# Patient Record
Sex: FEMALE | Race: WHITE | Employment: FULL TIME | URBAN - METROPOLITAN AREA
[De-identification: names, ages, dates, MRNs, and addresses within clinical notes are randomized per-mention and may not be internally consistent; named-entity substitution may affect disease eponyms.]

---

## 2018-08-22 ENCOUNTER — HOSPITAL ENCOUNTER (OUTPATIENT)
Dept: MAMMOGRAPHY | Age: 58
Discharge: HOME | End: 2018-08-22
Attending: SURGERY
Payer: COMMERCIAL

## 2018-08-22 DIAGNOSIS — Z12.31 VISIT FOR SCREENING MAMMOGRAM: ICD-10-CM

## 2018-08-22 PROCEDURE — 77063 BREAST TOMOSYNTHESIS BI: CPT

## 2019-08-23 ENCOUNTER — HOSPITAL ENCOUNTER (OUTPATIENT)
Dept: MAMMOGRAPHY | Age: 59
Discharge: HOME | End: 2019-08-23
Attending: SURGERY
Payer: COMMERCIAL

## 2019-08-23 DIAGNOSIS — Z12.31 VISIT FOR SCREENING MAMMOGRAM: ICD-10-CM

## 2019-08-23 PROCEDURE — 77067 SCR MAMMO BI INCL CAD: CPT

## 2020-09-04 ENCOUNTER — HOSPITAL ENCOUNTER (OUTPATIENT)
Dept: MAMMOGRAPHY | Age: 60
Discharge: HOME | End: 2020-09-04
Attending: SURGERY
Payer: COMMERCIAL

## 2020-09-04 DIAGNOSIS — Z12.31 VISIT FOR SCREENING MAMMOGRAM: ICD-10-CM

## 2020-09-04 PROCEDURE — 77067 SCR MAMMO BI INCL CAD: CPT

## 2021-08-31 ENCOUNTER — APPOINTMENT (OUTPATIENT)
Dept: GENERAL RADIOLOGY | Age: 61
DRG: 660 | End: 2021-08-31
Attending: UROLOGY
Payer: COMMERCIAL

## 2021-08-31 ENCOUNTER — APPOINTMENT (OUTPATIENT)
Dept: CT IMAGING | Age: 61
DRG: 660 | End: 2021-08-31
Attending: EMERGENCY MEDICINE
Payer: COMMERCIAL

## 2021-08-31 ENCOUNTER — ANESTHESIA (OUTPATIENT)
Dept: SURGERY | Age: 61
DRG: 660 | End: 2021-08-31
Payer: COMMERCIAL

## 2021-08-31 ENCOUNTER — HOSPITAL ENCOUNTER (INPATIENT)
Age: 61
LOS: 3 days | Discharge: HOME | DRG: 660 | End: 2021-09-03
Attending: EMERGENCY MEDICINE | Admitting: INTERNAL MEDICINE
Payer: COMMERCIAL

## 2021-08-31 ENCOUNTER — ANESTHESIA EVENT (OUTPATIENT)
Dept: SURGERY | Age: 61
DRG: 660 | End: 2021-08-31
Payer: COMMERCIAL

## 2021-08-31 DIAGNOSIS — N20.0 KIDNEY STONE: Primary | ICD-10-CM

## 2021-08-31 DIAGNOSIS — N13.2 HYDRONEPHROSIS WITH URINARY OBSTRUCTION DUE TO URETERAL CALCULUS: ICD-10-CM

## 2021-08-31 DIAGNOSIS — N30.00 ACUTE CYSTITIS WITHOUT HEMATURIA: ICD-10-CM

## 2021-08-31 LAB
ALBUMIN SERPL-MCNC: 2.5 G/DL (ref 3.5–5)
ALBUMIN/GLOB SERPL: 0.5 {RATIO} (ref 1.1–2.2)
ALP SERPL-CCNC: 158 U/L (ref 45–117)
ALT SERPL-CCNC: 43 U/L (ref 12–78)
ANION GAP SERPL CALC-SCNC: 13 MMOL/L (ref 5–15)
APPEARANCE UR: ABNORMAL
AST SERPL-CCNC: 24 U/L (ref 15–37)
BACTERIA URNS QL MICRO: ABNORMAL /HPF
BASOPHILS # BLD: 0 K/UL (ref 0–0.1)
BASOPHILS NFR BLD: 0 % (ref 0–1)
BILIRUB SERPL-MCNC: 0.5 MG/DL (ref 0.2–1)
BILIRUB UR QL: NEGATIVE
BUN SERPL-MCNC: 20 MG/DL (ref 6–20)
BUN/CREAT SERPL: 22 (ref 12–20)
CALCIUM SERPL-MCNC: 9.3 MG/DL (ref 8.5–10.1)
CHLORIDE SERPL-SCNC: 98 MMOL/L (ref 97–108)
CO2 SERPL-SCNC: 24 MMOL/L (ref 21–32)
COLOR UR: ABNORMAL
COMMENT, HOLDF: NORMAL
CREAT SERPL-MCNC: 0.9 MG/DL (ref 0.55–1.02)
DIFFERENTIAL METHOD BLD: ABNORMAL
EOSINOPHIL # BLD: 0 K/UL (ref 0–0.4)
EOSINOPHIL NFR BLD: 0 % (ref 0–7)
EPITH CASTS URNS QL MICRO: ABNORMAL /LPF
ERYTHROCYTE [DISTWIDTH] IN BLOOD BY AUTOMATED COUNT: 13.2 % (ref 11.5–14.5)
GLOBULIN SER CALC-MCNC: 5 G/DL (ref 2–4)
GLUCOSE SERPL-MCNC: 154 MG/DL (ref 65–100)
GLUCOSE UR STRIP.AUTO-MCNC: NEGATIVE MG/DL
HCT VFR BLD AUTO: 34.2 % (ref 35–47)
HGB BLD-MCNC: 11.3 G/DL (ref 11.5–16)
HGB UR QL STRIP: ABNORMAL
IMM GRANULOCYTES # BLD AUTO: 0.1 K/UL (ref 0–0.04)
IMM GRANULOCYTES NFR BLD AUTO: 1 % (ref 0–0.5)
KETONES UR QL STRIP.AUTO: >80 MG/DL
LACTATE BLD-SCNC: 1.06 MMOL/L (ref 0.4–2)
LEUKOCYTE ESTERASE UR QL STRIP.AUTO: ABNORMAL
LIPASE SERPL-CCNC: 33 U/L (ref 73–393)
LYMPHOCYTES # BLD: 0.7 K/UL (ref 0.8–3.5)
LYMPHOCYTES NFR BLD: 5 % (ref 12–49)
MCH RBC QN AUTO: 28.7 PG (ref 26–34)
MCHC RBC AUTO-ENTMCNC: 33 G/DL (ref 30–36.5)
MCV RBC AUTO: 86.8 FL (ref 80–99)
MONOCYTES # BLD: 0.6 K/UL (ref 0–1)
MONOCYTES NFR BLD: 4 % (ref 5–13)
MUCOUS THREADS URNS QL MICRO: ABNORMAL /LPF
NEUTS SEG # BLD: 12.7 K/UL (ref 1.8–8)
NEUTS SEG NFR BLD: 90 % (ref 32–75)
NITRITE UR QL STRIP.AUTO: POSITIVE
NRBC # BLD: 0 K/UL (ref 0–0.01)
NRBC BLD-RTO: 0 PER 100 WBC
PH UR STRIP: 6 [PH] (ref 5–8)
PLATELET # BLD AUTO: 310 K/UL (ref 150–400)
PMV BLD AUTO: 10.5 FL (ref 8.9–12.9)
POTASSIUM SERPL-SCNC: 2.8 MMOL/L (ref 3.5–5.1)
PROT SERPL-MCNC: 7.5 G/DL (ref 6.4–8.2)
PROT UR STRIP-MCNC: 100 MG/DL
RBC # BLD AUTO: 3.94 M/UL (ref 3.8–5.2)
RBC #/AREA URNS HPF: ABNORMAL /HPF (ref 0–5)
RBC MORPH BLD: ABNORMAL
SAMPLES BEING HELD,HOLD: NORMAL
SODIUM SERPL-SCNC: 135 MMOL/L (ref 136–145)
SP GR UR REFRACTOMETRY: 1.02 (ref 1–1.03)
UR CULT HOLD, URHOLD: NORMAL
UROBILINOGEN UR QL STRIP.AUTO: 1 EU/DL (ref 0.2–1)
WBC # BLD AUTO: 14.1 K/UL (ref 3.6–11)
WBC URNS QL MICRO: >100 /HPF (ref 0–4)

## 2021-08-31 PROCEDURE — 96365 THER/PROPH/DIAG IV INF INIT: CPT

## 2021-08-31 PROCEDURE — 36415 COLL VENOUS BLD VENIPUNCTURE: CPT

## 2021-08-31 PROCEDURE — 83690 ASSAY OF LIPASE: CPT

## 2021-08-31 PROCEDURE — 80053 COMPREHEN METABOLIC PANEL: CPT

## 2021-08-31 PROCEDURE — 87186 SC STD MICRODIL/AGAR DIL: CPT

## 2021-08-31 PROCEDURE — 76010000138 HC OR TIME 0.5 TO 1 HR: Performed by: UROLOGY

## 2021-08-31 PROCEDURE — 74011250637 HC RX REV CODE- 250/637: Performed by: INTERNAL MEDICINE

## 2021-08-31 PROCEDURE — 85025 COMPLETE CBC W/AUTO DIFF WBC: CPT

## 2021-08-31 PROCEDURE — 74011250636 HC RX REV CODE- 250/636: Performed by: NURSE ANESTHETIST, CERTIFIED REGISTERED

## 2021-08-31 PROCEDURE — 74011250636 HC RX REV CODE- 250/636: Performed by: EMERGENCY MEDICINE

## 2021-08-31 PROCEDURE — 74011000258 HC RX REV CODE- 258: Performed by: EMERGENCY MEDICINE

## 2021-08-31 PROCEDURE — 77030018832 HC SOL IRR H20 ICUM -A: Performed by: UROLOGY

## 2021-08-31 PROCEDURE — 74011000258 HC RX REV CODE- 258

## 2021-08-31 PROCEDURE — BT1B1ZZ FLUOROSCOPY OF BLADDER AND URETHRA USING LOW OSMOLAR CONTRAST: ICD-10-PCS | Performed by: UROLOGY

## 2021-08-31 PROCEDURE — C2617 STENT, NON-COR, TEM W/O DEL: HCPCS | Performed by: UROLOGY

## 2021-08-31 PROCEDURE — 76060000032 HC ANESTHESIA 0.5 TO 1 HR: Performed by: UROLOGY

## 2021-08-31 PROCEDURE — 74011000636 HC RX REV CODE- 636: Performed by: UROLOGY

## 2021-08-31 PROCEDURE — 83605 ASSAY OF LACTIC ACID: CPT

## 2021-08-31 PROCEDURE — 77030010509 HC AIRWY LMA MSK TELE -A: Performed by: ANESTHESIOLOGY

## 2021-08-31 PROCEDURE — 87040 BLOOD CULTURE FOR BACTERIA: CPT

## 2021-08-31 PROCEDURE — 0T768DZ DILATION OF RIGHT URETER WITH INTRALUMINAL DEVICE, VIA NATURAL OR ARTIFICIAL OPENING ENDOSCOPIC: ICD-10-PCS | Performed by: UROLOGY

## 2021-08-31 PROCEDURE — 99285 EMERGENCY DEPT VISIT HI MDM: CPT

## 2021-08-31 PROCEDURE — 70450 CT HEAD/BRAIN W/O DYE: CPT

## 2021-08-31 PROCEDURE — 76210000006 HC OR PH I REC 0.5 TO 1 HR: Performed by: UROLOGY

## 2021-08-31 PROCEDURE — 65270000029 HC RM PRIVATE

## 2021-08-31 PROCEDURE — 77030019927 HC TBNG IRR CYSTO BAXT -A: Performed by: UROLOGY

## 2021-08-31 PROCEDURE — 74011000250 HC RX REV CODE- 250

## 2021-08-31 PROCEDURE — 87077 CULTURE AEROBIC IDENTIFY: CPT

## 2021-08-31 PROCEDURE — 74011250636 HC RX REV CODE- 250/636: Performed by: INTERNAL MEDICINE

## 2021-08-31 PROCEDURE — 99284 EMERGENCY DEPT VISIT MOD MDM: CPT

## 2021-08-31 PROCEDURE — 2709999900 HC NON-CHARGEABLE SUPPLY: Performed by: UROLOGY

## 2021-08-31 PROCEDURE — 96375 TX/PRO/DX INJ NEW DRUG ADDON: CPT

## 2021-08-31 PROCEDURE — 74011250636 HC RX REV CODE- 250/636

## 2021-08-31 PROCEDURE — 74011250636 HC RX REV CODE- 250/636: Performed by: UROLOGY

## 2021-08-31 PROCEDURE — 87086 URINE CULTURE/COLONY COUNT: CPT

## 2021-08-31 PROCEDURE — 81001 URINALYSIS AUTO W/SCOPE: CPT

## 2021-08-31 PROCEDURE — C1758 CATHETER, URETERAL: HCPCS | Performed by: UROLOGY

## 2021-08-31 PROCEDURE — 74176 CT ABD & PELVIS W/O CONTRAST: CPT

## 2021-08-31 PROCEDURE — 74420 UROGRAPHY RTRGR +-KUB: CPT

## 2021-08-31 DEVICE — URETERAL STENT
Type: IMPLANTABLE DEVICE | Status: FUNCTIONAL
Brand: CONTOUR™

## 2021-08-31 RX ORDER — FENTANYL CITRATE 50 UG/ML
INJECTION, SOLUTION INTRAMUSCULAR; INTRAVENOUS AS NEEDED
Status: DISCONTINUED | OUTPATIENT
Start: 2021-08-31 | End: 2021-08-31 | Stop reason: HOSPADM

## 2021-08-31 RX ORDER — POTASSIUM CHLORIDE 7.45 MG/ML
10 INJECTION INTRAVENOUS
Status: DISCONTINUED | OUTPATIENT
Start: 2021-08-31 | End: 2021-08-31 | Stop reason: SDUPTHER

## 2021-08-31 RX ORDER — SODIUM CHLORIDE 0.9 % (FLUSH) 0.9 %
5-40 SYRINGE (ML) INJECTION AS NEEDED
Status: DISCONTINUED | OUTPATIENT
Start: 2021-08-31 | End: 2021-08-31 | Stop reason: HOSPADM

## 2021-08-31 RX ORDER — HYDROMORPHONE HYDROCHLORIDE 1 MG/ML
0.5 INJECTION, SOLUTION INTRAMUSCULAR; INTRAVENOUS; SUBCUTANEOUS ONCE
Status: COMPLETED | OUTPATIENT
Start: 2021-08-31 | End: 2021-08-31

## 2021-08-31 RX ORDER — SODIUM CHLORIDE, SODIUM LACTATE, POTASSIUM CHLORIDE, CALCIUM CHLORIDE 600; 310; 30; 20 MG/100ML; MG/100ML; MG/100ML; MG/100ML
INJECTION, SOLUTION INTRAVENOUS
Status: DISCONTINUED | OUTPATIENT
Start: 2021-08-31 | End: 2021-08-31 | Stop reason: HOSPADM

## 2021-08-31 RX ORDER — POTASSIUM CHLORIDE 7.45 MG/ML
10 INJECTION INTRAVENOUS ONCE
Status: COMPLETED | OUTPATIENT
Start: 2021-08-31 | End: 2021-08-31

## 2021-08-31 RX ORDER — ONDANSETRON 2 MG/ML
INJECTION INTRAMUSCULAR; INTRAVENOUS AS NEEDED
Status: DISCONTINUED | OUTPATIENT
Start: 2021-08-31 | End: 2021-08-31 | Stop reason: HOSPADM

## 2021-08-31 RX ORDER — ACETAMINOPHEN 325 MG/1
650 TABLET ORAL
Status: DISCONTINUED | OUTPATIENT
Start: 2021-08-31 | End: 2021-09-03 | Stop reason: HOSPADM

## 2021-08-31 RX ORDER — LEVOFLOXACIN 5 MG/ML
500 INJECTION, SOLUTION INTRAVENOUS EVERY 24 HOURS
Status: CANCELLED | OUTPATIENT
Start: 2021-09-01

## 2021-08-31 RX ORDER — SODIUM CHLORIDE 0.9 % (FLUSH) 0.9 %
5-40 SYRINGE (ML) INJECTION AS NEEDED
Status: CANCELLED | OUTPATIENT
Start: 2021-08-31

## 2021-08-31 RX ORDER — ONDANSETRON 4 MG/1
4 TABLET, ORALLY DISINTEGRATING ORAL
Status: DISCONTINUED | OUTPATIENT
Start: 2021-08-31 | End: 2021-09-03 | Stop reason: HOSPADM

## 2021-08-31 RX ORDER — MIDAZOLAM HYDROCHLORIDE 1 MG/ML
1 INJECTION, SOLUTION INTRAMUSCULAR; INTRAVENOUS AS NEEDED
Status: DISCONTINUED | OUTPATIENT
Start: 2021-08-31 | End: 2021-08-31 | Stop reason: HOSPADM

## 2021-08-31 RX ORDER — SODIUM CHLORIDE 0.9 % (FLUSH) 0.9 %
5-40 SYRINGE (ML) INJECTION AS NEEDED
Status: DISCONTINUED | OUTPATIENT
Start: 2021-08-31 | End: 2021-09-03 | Stop reason: HOSPADM

## 2021-08-31 RX ORDER — SODIUM CHLORIDE 0.9 % (FLUSH) 0.9 %
5-40 SYRINGE (ML) INJECTION EVERY 8 HOURS
Status: DISCONTINUED | OUTPATIENT
Start: 2021-08-31 | End: 2021-08-31 | Stop reason: HOSPADM

## 2021-08-31 RX ORDER — ACETAMINOPHEN 500 MG
1000 TABLET ORAL EVERY 6 HOURS
Status: CANCELLED | OUTPATIENT
Start: 2021-08-31

## 2021-08-31 RX ORDER — POLYETHYLENE GLYCOL 3350 17 G/17G
17 POWDER, FOR SOLUTION ORAL DAILY PRN
Status: DISCONTINUED | OUTPATIENT
Start: 2021-08-31 | End: 2021-09-03 | Stop reason: HOSPADM

## 2021-08-31 RX ORDER — HYDROMORPHONE HYDROCHLORIDE 1 MG/ML
INJECTION, SOLUTION INTRAMUSCULAR; INTRAVENOUS; SUBCUTANEOUS AS NEEDED
Status: DISCONTINUED | OUTPATIENT
Start: 2021-08-31 | End: 2021-08-31 | Stop reason: HOSPADM

## 2021-08-31 RX ORDER — DIPHENHYDRAMINE HYDROCHLORIDE 50 MG/ML
12.5 INJECTION, SOLUTION INTRAMUSCULAR; INTRAVENOUS AS NEEDED
Status: DISCONTINUED | OUTPATIENT
Start: 2021-08-31 | End: 2021-08-31 | Stop reason: HOSPADM

## 2021-08-31 RX ORDER — MORPHINE SULFATE 2 MG/ML
2 INJECTION, SOLUTION INTRAMUSCULAR; INTRAVENOUS
Status: DISCONTINUED | OUTPATIENT
Start: 2021-08-31 | End: 2021-08-31 | Stop reason: HOSPADM

## 2021-08-31 RX ORDER — PROMETHAZINE HYDROCHLORIDE 25 MG/1
12.5 TABLET ORAL
Status: CANCELLED | OUTPATIENT
Start: 2021-08-31

## 2021-08-31 RX ORDER — ONDANSETRON 2 MG/ML
4 INJECTION INTRAMUSCULAR; INTRAVENOUS AS NEEDED
Status: DISCONTINUED | OUTPATIENT
Start: 2021-08-31 | End: 2021-08-31 | Stop reason: HOSPADM

## 2021-08-31 RX ORDER — MIDAZOLAM HYDROCHLORIDE 1 MG/ML
0.5 INJECTION, SOLUTION INTRAMUSCULAR; INTRAVENOUS
Status: DISCONTINUED | OUTPATIENT
Start: 2021-08-31 | End: 2021-08-31 | Stop reason: HOSPADM

## 2021-08-31 RX ORDER — SODIUM CHLORIDE 0.9 % (FLUSH) 0.9 %
5-40 SYRINGE (ML) INJECTION EVERY 8 HOURS
Status: DISCONTINUED | OUTPATIENT
Start: 2021-08-31 | End: 2021-09-03 | Stop reason: HOSPADM

## 2021-08-31 RX ORDER — ONDANSETRON 2 MG/ML
4 INJECTION INTRAMUSCULAR; INTRAVENOUS
Status: DISCONTINUED | OUTPATIENT
Start: 2021-08-31 | End: 2021-09-03 | Stop reason: HOSPADM

## 2021-08-31 RX ORDER — DEXTROSE, SODIUM CHLORIDE, AND POTASSIUM CHLORIDE 5; .45; .15 G/100ML; G/100ML; G/100ML
100 INJECTION INTRAVENOUS CONTINUOUS
Status: DISCONTINUED | OUTPATIENT
Start: 2021-08-31 | End: 2021-09-03

## 2021-08-31 RX ORDER — LIDOCAINE HYDROCHLORIDE 20 MG/ML
INJECTION, SOLUTION EPIDURAL; INFILTRATION; INTRACAUDAL; PERINEURAL AS NEEDED
Status: DISCONTINUED | OUTPATIENT
Start: 2021-08-31 | End: 2021-08-31 | Stop reason: HOSPADM

## 2021-08-31 RX ORDER — LIDOCAINE HYDROCHLORIDE 10 MG/ML
0.1 INJECTION, SOLUTION EPIDURAL; INFILTRATION; INTRACAUDAL; PERINEURAL AS NEEDED
Status: DISCONTINUED | OUTPATIENT
Start: 2021-08-31 | End: 2021-08-31 | Stop reason: HOSPADM

## 2021-08-31 RX ORDER — SODIUM CHLORIDE, SODIUM LACTATE, POTASSIUM CHLORIDE, CALCIUM CHLORIDE 600; 310; 30; 20 MG/100ML; MG/100ML; MG/100ML; MG/100ML
125 INJECTION, SOLUTION INTRAVENOUS CONTINUOUS
Status: DISCONTINUED | OUTPATIENT
Start: 2021-08-31 | End: 2021-08-31 | Stop reason: HOSPADM

## 2021-08-31 RX ORDER — SODIUM CHLORIDE 0.9 % (FLUSH) 0.9 %
5-40 SYRINGE (ML) INJECTION EVERY 8 HOURS
Status: CANCELLED | OUTPATIENT
Start: 2021-08-31

## 2021-08-31 RX ORDER — OXYCODONE AND ACETAMINOPHEN 5; 325 MG/1; MG/1
1 TABLET ORAL AS NEEDED
Status: DISCONTINUED | OUTPATIENT
Start: 2021-08-31 | End: 2021-08-31 | Stop reason: HOSPADM

## 2021-08-31 RX ORDER — LANOLIN ALCOHOL/MO/W.PET/CERES
3 CREAM (GRAM) TOPICAL
Status: DISCONTINUED | OUTPATIENT
Start: 2021-08-31 | End: 2021-09-03 | Stop reason: HOSPADM

## 2021-08-31 RX ORDER — HYDROMORPHONE HYDROCHLORIDE 1 MG/ML
0.2 INJECTION, SOLUTION INTRAMUSCULAR; INTRAVENOUS; SUBCUTANEOUS
Status: DISCONTINUED | OUTPATIENT
Start: 2021-08-31 | End: 2021-08-31 | Stop reason: HOSPADM

## 2021-08-31 RX ORDER — ACETAMINOPHEN 650 MG/1
650 SUPPOSITORY RECTAL
Status: DISCONTINUED | OUTPATIENT
Start: 2021-08-31 | End: 2021-09-03 | Stop reason: HOSPADM

## 2021-08-31 RX ORDER — OXYCODONE HYDROCHLORIDE 5 MG/1
5 TABLET ORAL
Status: CANCELLED | OUTPATIENT
Start: 2021-08-31

## 2021-08-31 RX ORDER — ONDANSETRON 2 MG/ML
4 INJECTION INTRAMUSCULAR; INTRAVENOUS
Status: CANCELLED | OUTPATIENT
Start: 2021-08-31

## 2021-08-31 RX ORDER — SODIUM CHLORIDE 9 MG/ML
25 INJECTION, SOLUTION INTRAVENOUS CONTINUOUS
Status: DISCONTINUED | OUTPATIENT
Start: 2021-08-31 | End: 2021-08-31 | Stop reason: HOSPADM

## 2021-08-31 RX ORDER — ACETAMINOPHEN 325 MG/1
650 TABLET ORAL ONCE
Status: DISCONTINUED | OUTPATIENT
Start: 2021-08-31 | End: 2021-08-31 | Stop reason: HOSPADM

## 2021-08-31 RX ORDER — FENTANYL CITRATE 50 UG/ML
50 INJECTION, SOLUTION INTRAMUSCULAR; INTRAVENOUS AS NEEDED
Status: DISCONTINUED | OUTPATIENT
Start: 2021-08-31 | End: 2021-08-31 | Stop reason: HOSPADM

## 2021-08-31 RX ORDER — ONDANSETRON 2 MG/ML
8 INJECTION INTRAMUSCULAR; INTRAVENOUS
Status: COMPLETED | OUTPATIENT
Start: 2021-08-31 | End: 2021-08-31

## 2021-08-31 RX ORDER — KETOROLAC TROMETHAMINE 30 MG/ML
15 INJECTION, SOLUTION INTRAMUSCULAR; INTRAVENOUS
Status: COMPLETED | OUTPATIENT
Start: 2021-08-31 | End: 2021-08-31

## 2021-08-31 RX ORDER — ONDANSETRON 4 MG/1
4 TABLET, ORALLY DISINTEGRATING ORAL
Status: CANCELLED | OUTPATIENT
Start: 2021-08-31

## 2021-08-31 RX ORDER — MIDAZOLAM HYDROCHLORIDE 1 MG/ML
INJECTION, SOLUTION INTRAMUSCULAR; INTRAVENOUS AS NEEDED
Status: DISCONTINUED | OUTPATIENT
Start: 2021-08-31 | End: 2021-08-31 | Stop reason: HOSPADM

## 2021-08-31 RX ORDER — PROPOFOL 10 MG/ML
INJECTION, EMULSION INTRAVENOUS AS NEEDED
Status: DISCONTINUED | OUTPATIENT
Start: 2021-08-31 | End: 2021-08-31 | Stop reason: HOSPADM

## 2021-08-31 RX ORDER — FENTANYL CITRATE 50 UG/ML
25 INJECTION, SOLUTION INTRAMUSCULAR; INTRAVENOUS
Status: DISCONTINUED | OUTPATIENT
Start: 2021-08-31 | End: 2021-08-31 | Stop reason: HOSPADM

## 2021-08-31 RX ADMIN — CEFTRIAXONE SODIUM 1 G: 1 INJECTION, POWDER, FOR SOLUTION INTRAMUSCULAR; INTRAVENOUS at 06:40

## 2021-08-31 RX ADMIN — ONDANSETRON HYDROCHLORIDE 4 MG: 2 INJECTION, SOLUTION INTRAMUSCULAR; INTRAVENOUS at 22:46

## 2021-08-31 RX ADMIN — ONDANSETRON HYDROCHLORIDE 4 MG: 2 INJECTION, SOLUTION INTRAMUSCULAR; INTRAVENOUS at 11:06

## 2021-08-31 RX ADMIN — POTASSIUM CHLORIDE 10 MEQ: 7.46 INJECTION, SOLUTION INTRAVENOUS at 09:58

## 2021-08-31 RX ADMIN — ACETAMINOPHEN 650 MG: 325 TABLET ORAL at 10:11

## 2021-08-31 RX ADMIN — Medication 10 ML: at 17:05

## 2021-08-31 RX ADMIN — SODIUM CHLORIDE 1000 ML: 9 INJECTION, SOLUTION INTRAVENOUS at 06:26

## 2021-08-31 RX ADMIN — LIDOCAINE HYDROCHLORIDE 60 MG: 20 INJECTION, SOLUTION EPIDURAL; INFILTRATION; INTRACAUDAL; PERINEURAL at 10:40

## 2021-08-31 RX ADMIN — ONDANSETRON 8 MG: 2 INJECTION INTRAMUSCULAR; INTRAVENOUS at 05:10

## 2021-08-31 RX ADMIN — HYDROMORPHONE HYDROCHLORIDE 0.5 MG: 1 INJECTION, SOLUTION INTRAMUSCULAR; INTRAVENOUS; SUBCUTANEOUS at 06:40

## 2021-08-31 RX ADMIN — MIDAZOLAM 2 MG: 1 INJECTION INTRAMUSCULAR; INTRAVENOUS at 10:30

## 2021-08-31 RX ADMIN — SODIUM CHLORIDE 1000 ML: 9 INJECTION, SOLUTION INTRAVENOUS at 05:09

## 2021-08-31 RX ADMIN — FENTANYL CITRATE 100 MCG: 50 INJECTION, SOLUTION INTRAMUSCULAR; INTRAVENOUS at 10:40

## 2021-08-31 RX ADMIN — HYDROMORPHONE HYDROCHLORIDE 0.2 MG: 1 INJECTION, SOLUTION INTRAMUSCULAR; INTRAVENOUS; SUBCUTANEOUS at 10:43

## 2021-08-31 RX ADMIN — KETOROLAC TROMETHAMINE 15 MG: 30 INJECTION, SOLUTION INTRAMUSCULAR; INTRAVENOUS at 05:13

## 2021-08-31 RX ADMIN — PROPOFOL 150 MG: 10 INJECTION, EMULSION INTRAVENOUS at 10:40

## 2021-08-31 RX ADMIN — POTASSIUM CHLORIDE, DEXTROSE MONOHYDRATE AND SODIUM CHLORIDE 100 ML/HR: 150; 5; 450 INJECTION, SOLUTION INTRAVENOUS at 12:53

## 2021-08-31 RX ADMIN — Medication 3 MG: at 22:39

## 2021-08-31 RX ADMIN — DEXMEDETOMIDINE HYDROCHLORIDE 4 MCG: 100 INJECTION, SOLUTION, CONCENTRATE INTRAVENOUS at 10:43

## 2021-08-31 RX ADMIN — SODIUM CHLORIDE, SODIUM LACTATE, POTASSIUM CHLORIDE, AND CALCIUM CHLORIDE: 600; 310; 30; 20 INJECTION, SOLUTION INTRAVENOUS at 10:30

## 2021-08-31 NOTE — H&P
History & Physical    Primary Care Provider: Simran Amanda MD  Source of Information: Patient     History of Presenting Illness:   Cleone Collet is a 61 y.o. female w/hx of breast cancer and kidney stones presents to the hospital with nausea, vomiting, left flank pain. Patient was also having low grade fever, decreased urine output and generalized body aches. She has no burning or frequency but did notice some blood in her urine and did a over-the-counter urine test and talked to her PCP who prescribed bactrim which she took one dose prior to hospital presentation. Review of Systems:  Review of Systems   Constitutional: Per HPI   HENT: Negative for ear pain, facial swelling, sore throat and trouble swallowing.    Eyes: No visual disturbance. Negative for pain and discharge. Respiratory: Negative for chest tightness, shortness of breath and wheezing.    Cardiovascular: Negative for chest pain and palpitations. Gastrointestinal: Negative for abdominal pain, blood in stool, nausea and vomiting. Genitourinary: Per HPI   Musculoskeletal: Negative for arthralgias, joint swelling, myalgias and neck pain. Skin: Negative for color change and rash. Neurological: Negative for  dizziness, headache, weakness or numbness. Hematological: Negative for adenopathy. Does not bruise/bleed easily. Psychiatric/Behavioral: Negative for behavioral problems, confusion and sleep disturbance.   All other systems reviewed and are negative. Past Medical History:   Diagnosis Date    Breast cancer, stage 1 ([facility] [address] 2007    Right    Calculus of kidney     Cancer ([facility] [address]     Cigarette smoker       Past Surgical History:   Procedure Laterality Date    ENDOSCOPY, COLON, DIAGNOSTIC      HX BREAST LUMPECTOMY Right 2007    radiation    HX LITHOTRIPSY       Prior to Admission medications    Medication Sig Start Date End Date Taking?  Authorizing Provider   ondansetron [facility] ODT) 4 mg disintegrating tablet Take 4 mg by mouth every eight (8) hours as needed for Nausea. Yes Provider, Historical   ondansetron (ZOFRAN ODT) 4 mg disintegrating tablet Take 1 Tab by mouth every eight (8) hours as needed for Nausea. 5/1/16  Yes Sari Primrose, MD     Allergies   Allergen Reactions    Codeine Nausea and Vomiting    Pcn [Penicillins] Nausea and Vomiting      Family History   Problem Relation Age of Onset   [facility] Stroke Mother     Heart Attack Mother     Coronary Artery Disease Mother     Heart Attack Father     Thyroid Disease Father     Coronary Artery Disease Father     Heart Attack Sister     High Cholesterol Sister     Coronary Artery Disease Sister     Heart Attack Brother     Coronary Artery Disease Brother         SOCIAL HISTORY:  Patient resides:  Independently x   Assisted Living    SNF    With family care       Smoking history:   None    Former    Chronic x     Alcohol history:   None    Social x   Chronic      Ambulates:   Independently x   w/cane    w/walker    w/wc    CODE STATUS:  DNR    Full x   Other      Objective:     Physical Exam:     Visit Vitals  /71   Pulse 77   Temp 98.1 °F (36.7 °C)   Resp 24   LMP 06/07/2002   SpO2 96%      O2 Device: None (Room air)    General:  Alert, cooperative, no distress, appears stated age. Head:  Normocephalic, without obvious abnormality, atraumatic. Eyes:  Conjunctivae/corneas clear. PERRL, EOMs intact. Nose: Nares normal. Septum midline. Mucosa normal. No drainage or sinus tenderness. Throat: Lips, mucosa, and tongue normal. Teeth and gums normal.   Neck: Supple, symmetrical, trachea midline, no adenopathy, thyroid: no enlargement/tenderness/nodules, no carotid bruit and no JVD. Back:   Symmetric, no curvature. ROM normal. No CVA tenderness. Lungs:   Clear to auscultation bilaterally. Chest wall:  No tenderness or deformity. Heart:  Regular rate and rhythm, S1, S2 normal, no murmur, click, rub or gallop. Abdomen:   Soft, right mild tenderness on RLQ. Bowel sounds normal. No masses,  No organomegaly. Extremities: Extremities normal, atraumatic, no cyanosis or edema. Pulses: 2+ and symmetric all extremities. Skin: Skin color, texture, turgor normal. No rashes or lesions   Neurologic: CNII-XII intact. Data Review:     Recent Days:  Recent Labs     08/31/21  0351   WBC 14.1*   HGB 11.3*   HCT 34.2*        Recent Labs     08/31/21  0351   *   K 2.8*   CL 98   CO2 24   *   BUN 20   CREA 0.90   CA 9.3   ALB 2.5*   TBILI 0.5   ALT 43     No results for input(s): PH, PCO2, PO2, HCO3, FIO2 in the last 72 hours. 24 Hour Results:  Recent Results (from the past 24 hour(s))   SAMPLES BEING HELD    Collection Time: 08/31/21  3:47 AM   Result Value Ref Range    SAMPLES BEING HELD 1RED,1BLU     COMMENT        Add-on orders for these samples will be processed based on acceptable specimen integrity and analyte stability, which may vary by analyte. CBC WITH AUTOMATED DIFF    Collection Time: 08/31/21  3:51 AM   Result Value Ref Range    WBC 14.1 (H) 3.6 - 11.0 K/uL    RBC 3.94 3.80 - 5.20 M/uL    HGB 11.3 (L) 11.5 - 16.0 g/dL    HCT 34.2 (L) 35.0 - 47.0 %    MCV 86.8 80.0 - 99.0 FL    MCH 28.7 26.0 - 34.0 PG    MCHC 33.0 30.0 - 36.5 g/dL    RDW 13.2 11.5 - 14.5 %    PLATELET 693 540 - 036 K/uL    MPV 10.5 8.9 - 12.9 FL    NRBC 0.0 0  WBC    ABSOLUTE NRBC 0.00 0.00 - 0.01 K/uL    NEUTROPHILS 90 (H) 32 - 75 %    LYMPHOCYTES 5 (L) 12 - 49 %    MONOCYTES 4 (L) 5 - 13 %    EOSINOPHILS 0 0 - 7 %    BASOPHILS 0 0 - 1 %    IMMATURE GRANULOCYTES 1 (H) 0.0 - 0.5 %    ABS. NEUTROPHILS 12.7 (H) 1.8 - 8.0 K/UL    ABS. LYMPHOCYTES 0.7 (L) 0.8 - 3.5 K/UL    ABS. MONOCYTES 0.6 0.0 - 1.0 K/UL    ABS. EOSINOPHILS 0.0 0.0 - 0.4 K/UL    ABS. BASOPHILS 0.0 0.0 - 0.1 K/UL    ABS. IMM.  GRANS. 0.1 (H) 0.00 - 0.04 K/UL    DF SMEAR SCANNED      RBC COMMENTS NORMOCYTIC, NORMOCHROMIC     METABOLIC PANEL, COMPREHENSIVE    Collection Time: 08/31/21  3:51 AM   Result Value Ref Range    Sodium 135 (L) 136 - 145 mmol/L    Potassium 2.8 (L) 3.5 - 5.1 mmol/L    Chloride 98 97 - 108 mmol/L    CO2 24 21 - 32 mmol/L    Anion gap 13 5 - 15 mmol/L    Glucose 154 (H) 65 - 100 mg/dL    BUN 20 6 - 20 MG/DL    Creatinine 0.90 0.55 - 1.02 MG/DL    BUN/Creatinine ratio 22 (H) 12 - 20      GFR est AA >60 >60 ml/min/1.73m2    GFR est non-AA >60 >60 ml/min/1.73m2    Calcium 9.3 8.5 - 10.1 MG/DL    Bilirubin, total 0.5 0.2 - 1.0 MG/DL    ALT (SGPT) 43 12 - 78 U/L    AST (SGOT) 24 15 - 37 U/L    Alk. phosphatase 158 (H) 45 - 117 U/L    Protein, total 7.5 6.4 - 8.2 g/dL    Albumin 2.5 (L) 3.5 - 5.0 g/dL    Globulin 5.0 (H) 2.0 - 4.0 g/dL    A-G Ratio 0.5 (L) 1.1 - 2.2     LIPASE    Collection Time: 08/31/21  3:51 AM   Result Value Ref Range    Lipase 33 (L) 73 - 393 U/L   POC LACTIC ACID    Collection Time: 08/31/21  5:11 AM   Result Value Ref Range    Lactic Acid (POC) 1.06 0.40 - 2.00 mmol/L   URINALYSIS W/MICROSCOPIC    Collection Time: 08/31/21  6:26 AM   Result Value Ref Range    Color YELLOW/STRAW      Appearance HAZY (A) CLEAR      Specific gravity 1.025 1.003 - 1.030      pH (UA) 6.0 5.0 - 8.0      Protein 100 (A) NEG mg/dL    Glucose Negative NEG mg/dL    Ketone >80 (A) NEG mg/dL    Bilirubin Negative NEG      Blood LARGE (A) NEG      Urobilinogen 1.0 0.2 - 1.0 EU/dL    Nitrites Positive (A) NEG      Leukocyte Esterase LARGE (A) NEG      WBC >100 (H) 0 - 4 /hpf    RBC  0 - 5 /hpf    Epithelial cells FEW FEW /lpf    Bacteria 4+ (A) NEG /hpf    Mucus 1+ (A) NEG /lpf   URINE CULTURE HOLD SAMPLE    Collection Time: 08/31/21  6:26 AM    Specimen: Serum; Urine   Result Value Ref Range    Urine culture hold        Urine on hold in Microbiology dept for 2 days. If unpreserved urine is submitted, it cannot be used for addtional testing after 24 hours, recollection will be required.          Imaging:     Assessment:     # Acute pyelonephritis   # Right hydronephrosis w/ obstructing nephrolithiasis   # Hypokalemia        Plan:     # Acute pyelonephritis  # Right hydronephrosis w/ obstructing nephrolithiasis  - UA suggestive for UTI  - follow urine and blood culture   - continue on Ceftriaxone   - IVF, antiemetic   - Urology consulted-- plan for ureteral stent placement     # Hypokalemia: replace as need     DVT ppx: Heparin   Code: Full        Signed By: Jaleel Dai MD     August 31, 2021

## 2021-08-31 NOTE — PERIOP NOTES
TRANSFER - OUT REPORT:    Verbal report given to   (name) on Ben Pel  being transferred to [facility](unit) for routine post - op       Report consisted of patients Situation, Background, Assessment and   Recommendations(SBAR). Time Pre op antibiotic given:Rocephin  Anesthesia Stop time: 11;15  White Present on Transfer to floor:no  Order for White on Chart:no  Discharge Prescriptions with Chart:no    Information from the following report(s) SBAR, Kardex, OR Summary, Procedure Summary, Intake/Output, MAR, Recent Results, Med Rec Status and Cardiac Rhythm SR was reviewed with the receiving nurse. Opportunity for questions and clarification was provided. Is the patient on 02? no       L/Min        Other     Is the patient on a monitor? NO    Is the nurse transporting with the patient? NO    Surgical Waiting Area notified of patient's transfer from PACU? YES      The following personal items collected during your admission accompanied patient upon transfer:   Dental Appliance: Dental Appliances: None  Vision: Visual Aid: Glasses, At bedside  Hearing Aid:    Jewelry: Jewelry: None  Clothing: Clothing: Bathrobe, Undergarments  Other Valuables:  Other Valuables: Cell Phone, Eyeglasses (Cell Phone sent to [employer])  Valuables sent to safe:

## 2021-08-31 NOTE — ED PROVIDER NOTES
HPI     61 female with a history of breast cancer, kidney stones, presents the emergency department with persistent headache nausea vomiting low-grade fevers and now left flank pain. Patient states she started to get sick last Thursday, 5 to 6 days ago with a headache and vomiting. [facility] and was [facility]. She is felt a little bit better on Friday with ODT Zofran and Motrin. She started vomiting again Friday evening so she went [place] [facility] [address] [place] [facility] on Saturday received IV Zofran a \"cut migraine cocktail\" CT scan of her head and did feel better. She states she is continuing to have vomiting decreased urine output. She has all over body aches. This evening she noticed left flank pain around 4 PM she has not been able to keep anything down for days. There is no diarrhea. She has no burning or frequency but did notice some blood in her urine and did a over-the-counter urine test.  Her doctor called in Bactrim which she took 1 dose today. She reports her headache is a 7 out of 10 and her flank pain is a 7-8 out of 10. She states they tested her for Covid on Saturday and it was negative. She has been vaccinated. She does have a slight cough.   She states fever started yesterday    Past Medical History:   Diagnosis Date    Breast cancer, stage 1 ([address] 2007    Right    Calculus of kidney     Cancer ([address]     Cigarette smoker        Past Surgical History:   Procedure Laterality Date    ENDOSCOPY, COLON, DIAGNOSTIC      HX BREAST LUMPECTOMY Right 2007    radiation    HX LITHOTRIPSY           Family History:   Problem Relation Age of Onset   [employer] Stroke Mother     Heart Attack Mother     Coronary Artery Disease Mother     Heart Attack Father     Thyroid Disease Father     Coronary Artery Disease Father     Heart Attack Sister     High Cholesterol Sister     Coronary Artery Disease Sister     Heart Attack Brother     Coronary Artery Disease Brother        Social History Socioeconomic History    Marital status: SINGLE     Spouse name: Not on file    Number of children: Not on file    Years of education: Not on file    Highest education level: Not on file   Occupational History    Not on file   Tobacco Use    Smoking status: Current Every Day Smoker    Smokeless tobacco: Never Used   Substance and Sexual Activity    Alcohol use: Yes    Drug use: No    Sexual activity: Yes     Partners: Female   Other Topics Concern    Not on file   Social History Narrative    Not on file     Social Determinants of Health     Financial Resource Strain:     Difficulty of Paying Living Expenses:    Food Insecurity:     Worried About Running Out of Food in the Last Year:     [address] in the Last Year:    Transportation Needs:     Lack of Transportation (Medical):  Lack of Transportation (Non-Medical):    Physical Activity:     Days of Exercise per Week:     Minutes of Exercise per Session:    Stress:     Feeling of Stress :    Social Connections:     Frequency of Communication with Friends and Family:     Frequency of Social Gatherings with Friends and Family:     Attends Muslim Services:     Active Member of Clubs or Organizations:     Attends Club or Organization Meetings:     Marital Status:    Intimate Partner Violence:     Fear of Current or Ex-Partner:     Emotionally Abused:     Physically Abused:     Sexually Abused: ALLERGIES: Codeine and Pcn [penicillins]    Review of Systems   Constitutional: Positive for fever. HENT: Positive for congestion. Eyes: Negative for visual disturbance. Respiratory: Negative for shortness of breath. Cardiovascular: Negative for chest pain. Gastrointestinal: Positive for abdominal pain, nausea and vomiting. Endocrine: Negative for polyuria. Genitourinary: Positive for decreased urine volume, flank pain and hematuria. Negative for dysuria. Musculoskeletal: Negative for gait problem. Neurological: Positive for headaches. Psychiatric/Behavioral: Negative for dysphoric mood. Vitals:    08/31/21 0320   BP: (!) 151/77   Pulse: 72   Resp: 16   Temp: 98 °F (36.7 °C)   SpO2: 98%            Physical Exam  Constitutional:       General: She is not in acute distress. Appearance: She is well-developed. Comments: Appears uncomfortable, sick but not toxic appearing   HENT:      Head: Normocephalic and atraumatic. Mouth/Throat:      Pharynx: No oropharyngeal exudate. Eyes:      General: No scleral icterus. Right eye: No discharge. Left eye: No discharge. Pupils: Pupils are equal, round, and reactive to light. Neck:      Vascular: No JVD. Cardiovascular:      Rate and Rhythm: Normal rate and regular rhythm. Heart sounds: Normal heart sounds. No murmur heard. Pulmonary:      Effort: Pulmonary effort is normal. No respiratory distress. Breath sounds: Normal breath sounds. No stridor. No wheezing or rales. Chest:      Chest wall: No tenderness. Abdominal:      General: Bowel sounds are normal. There is no distension. Palpations: Abdomen is soft. There is no mass. Tenderness: There is no abdominal tenderness. There is no guarding or rebound. Musculoskeletal:         General: Normal range of motion. Cervical back: Normal range of motion and neck supple. Skin:     General: Skin is warm and dry. Capillary Refill: Capillary refill takes less than 2 seconds. Findings: No rash. Neurological:      Mental Status: She is oriented to person, place, and time. Psychiatric:         Behavior: Behavior normal.         Thought Content: Thought content normal.         Judgment: Judgment normal.          MDM       Procedures      WBC 14K.  UTI.  5mm mid ureteral stone with hydro. Spoke with Urology, Dr. Xiao Juarez. Would like patient admitted to medicine, keep NPO and they will likely stent this morning.      Perfect Serve Consult for Admission  7:17 AM    ED Room Number: ER05/05  Patient Name and age:  Jaya Oliveira 61 y.o.  female  Working Diagnosis:   1. Kidney stone    2. Hydronephrosis with urinary obstruction due to ureteral calculus    3. Acute cystitis without hematuria        COVID-19 Suspicion:  no  Sepsis present:  no  Reassessment needed: no  Code Status:  Full Code  Readmission: no  Isolation Requirements:  no  Recommended Level of Care:  telemetry  Department:[facility] Adult ED - 21   Other:  61year old female without significnat PMHx other then kdiney stones, presents with fever, flank pain, headache, nausea/vomiting, WBC 14K here, lactate normal.  CT shows 5mm mid ureter stone with hdyro,  Has UTI as well. I spoke with urology, Dr. Theresa Chaparro- he would like her admitted, kept NPO and they will likely stent today. She's received fluids, zofran, ceftriaxone, toradol and dilaudid to control pain.

## 2021-08-31 NOTE — BRIEF OP NOTE
Brief Postoperative Note    Patient: Ben Krueger  YOB: 1960  MRN: [id number]    Date of Procedure: 8/31/2021     Pre-Op Diagnosis: unknown    Post-Op Diagnosis: Same as preoperative diagnosis.       Procedure(s):  CYSTO RIGHT STENT/ LEFT RETROGRADES/ ER    Surgeon(s):  Florence David MD    Surgical Assistant: None    Anesthesia: General     Estimated Blood Loss (mL): Minimal    Complications: None    Specimens: * No specimens in log *     Implants: * No implants in log *    Drains: * No LDAs found *    Findings: Right ureteral obstructing stone    Electronically Signed by Aissatou Rivers MD on 8/31/2021 at 11:19 AM

## 2021-08-31 NOTE — ED NOTES
TRANSFER - OUT REPORT:    Verbal report given to Mau Ramos RN (name) on Chely Cowart  being transferred to OR (unit) for routine progression of care       Report consisted of patients Situation, Background, Assessment and   Recommendations(SBAR). Information from the following report(s) SBAR, ED Summary and MAR was reviewed with the receiving nurse. Lines:   Peripheral IV 08/31/21 Left Antecubital (Active)        Opportunity for questions and clarification was provided.       Patient transported with:   [facility]

## 2021-08-31 NOTE — ED TRIAGE NOTES
She arrives by EMS. She reports being seen by [facility] Saturday for headache, and vomiting. She then had back pain today. She took an at home test that showed bladder infection. She spoke to her provider and was ordered Bactrim. She has been unable to keep the Bactrim down. The headache continues.

## 2021-08-31 NOTE — OP NOTES
UROLOGY OPERATIVE NOTE    Patient: Dannielle Bryan MRN: [id number]  SSN: [id number]    YOB: 1960  Age: 61 y.o. Sex: female          Pre-operative Diagnosis: Right ureteral stone, left flank pain  Post-operative Diagnosis: Same, no obstruction on left  Procedure: Cystoscopy, Ureteral Stent                    Right 6x24, Left Limited retrograde pyelogram   Surgeon: Joseph Brown MD  Assistant: None  Anesthesia:  General    Procedure Details: The patient was seen in the pre-operative area. The risks, benefits, complications, alternative treatment options, and expected outcomes were again discussed with the patient. The possibilities of reaction to medication, pain, infection, bleeding, major cardiovascular event, death, damage to surrounding structures were specifically addressed. Informed consent was then obtained. The site of surgery properly noted/marked. Upon arrival to the operative suite, the patient, procedure, and side were confirmed via a pre-operative \"time-out\". All were in agreement. The patient was carefully placed  in a dorsal lithotomy position and anethesia was induced. Sterile prep and drape of genitalia and perineum was performed. The cystoscope was inserted through a normal urethra. Survey of the bladder surface revealed no abnormalities. A guide wire was advanced up the  Right symptomatic side and seen it coil in the upper collecting system using fluoroscopy. The ureteral stent was then advanced over the wire. Coils were left to form in the renal pelvis and bladder nicely. An open end catheter was used to intubate the left ureter to the renal pelvis. The collecting system was illuminated using retrograde instillation of a limited amount of dye. No obstruction seen. The bladder was emptied, the scope was removed. At he conclusion of the case, all needle counts, instrument counts, and sponge counts were correct.     Findings:Right ureteral obstruction, Left no obstruction  Estimated Blood Loss:  Minimal     Implants: * No implants in log *  Disposition: Admit for IV abx  Plan:  KUB to assess stone opacity to allow for decision of ESWL vs ureteroscopy            Amaury Prieto MD

## 2021-08-31 NOTE — ANESTHESIA PREPROCEDURE EVALUATION
Relevant Problems   No relevant active problems       Anesthetic History   No history of anesthetic complications            Review of Systems / Medical History  Patient summary reviewed, nursing notes reviewed and pertinent labs reviewed    Pulmonary          Smoker         Neuro/Psych   Within defined limits           Cardiovascular  Within defined limits                     GI/Hepatic/Renal  Within defined limits              Endo/Other        Cancer     Other Findings              Physical Exam    Airway  Mallampati: II  TM Distance: > 6 cm  Neck ROM: normal range of motion   Mouth opening: Normal     Cardiovascular  Regular rate and rhythm,  S1 and S2 normal,  no murmur, click, rub, or gallop             Dental  No notable dental hx       Pulmonary  Breath sounds clear to auscultation               Abdominal  GI exam deferred       Other Findings            Anesthetic Plan    ASA: 2  Anesthesia type: general          Induction: Intravenous  Anesthetic plan and risks discussed with: Patient

## 2021-08-31 NOTE — PERIOP NOTES
TRANSFER - IN REPORT:    Verbal report received from [facility] RN(name) on Jaya Oliveira  being received from ED(unit) for ordered procedure      Report consisted of patients Situation, Background, Assessment and   Recommendations(SBAR). Information from the following report(s) SBAR, ED Summary, [place] and Recent Results was reviewed with the receiving nurse. Opportunity for questions and clarification was provided. Assessment completed upon patients arrival to unit and care assumed.

## 2021-08-31 NOTE — CONSULTS
Requesting Provider: Dirk Hester MD - Reason for Consultation: Marquita Whittington"  Pre-existing [facility] Patient:   Yes                Patient: Neftali Wolff MRN: [id number]  SSN: [id number]    YOB: 1960  Age: 61 y.o. Sex: female     Location: [place] [address]       Code Status: Full Code   PCP: Linda Carbajal MD  - [phone]   Emergency Contact:  Primary Emergency Contact: Lauren Kirkland   Race/Advent/Language: Maribel Reardon / Faina Lopez / Alice May: Payor: Brent Peter / Plan: Roxanne Cancer / Product Type: HMO /    Prior Admission Data: 5/1/16 [facility] EMERGENCY DEPT     Hospitalized:  Hospital Day: 1 - Admitted 8/31/2021  3:09 AM     CONSULTANTS  IP CONSULT TO UROLOGY   ADMISSION DIAGNOSES    ICD-10-CM ICD-9-CM   1. Kidney stone  N20.0 592.0   2. Hydronephrosis with urinary obstruction due to ureteral calculus  N13.2 592.1     591   3. Acute cystitis without hematuria  N30.00 595.0         Assessment/Plan:       · Left flank pain  · Right 5 mm mid ureteral stone with hydronephrosis  · Pyelonephritis    - Concern for decompensation and sepsis with urinary infection in the setting of ureteral obstruction. I have recommended cysto with right stent placement and left retrogrades today. Risks and benefits reviewed with the patient and she wishes to proceed. She has been NPO. Will arrange with the OR.   - Continue broad spectrum abx, follow cultures then tailor.   - Will follow. Supervising Dr. Greg ROSE     CC: Vomiting   HPI: She is a 61 y.o. female with a past medical history of breast cancer and kidney stones who presents to the emergency department with persistent headache, nausea, vomiting, and body aches for a week. She reports onset of fever to 100.8 F and left flank pain in the last 1-2 days with associated decreased UOP and hematuria. She notes some mild right flank pain. No relief with motrin.  Pain currently controlled with PRN dilaudid. She called her PCP who prescribed Bactrim, she took one dose last night. Seen in consultation by Urology for findings of a right ureteral stone on CT. She is a known patient to [facility], followed by Dr. Rosita Awad for hx of Gross hematuria, recurrent UTIs, left lower pole stone causing ball-valving and left adrenal adenoma. Lithotripsy was done for the stone in March 2021. Last seen in May 2021, see last OV note below. In the ED she is afebrile. HR and BP stable. WBC 14.1, Hgb 11.3, Cr 0.90. UA positive for large blood, nitrites, large leuks, >100 WBCs,  RBCs, 4+ bacteria. UCx and BCx pending. She is being treated empirically with Rocephin. CT A/P findings significant for right hydroureteronephrosis to a 5 mm mid ureteral calculus at the level of L5. She also has several nonobstructing punctate left renal calculi, no hydro. Images personally reviewed.       ======last OV note====  She returns for follow-up. Gross hematuria, recurrent UTIs, lower pole stone causing ball-valving and left adrenal adenoma. Returns after adrenal labs which were normal and reviewed. Ultrasound reviewed and it shows some fullness in the right kidney that could be a peripelvic cyst.  No definite hydronephrosis noted. She is no longer having a ball-valving pain or hematuria. Lithotripsy was done for the stone March 2021. PLAN:  Hematuria, recurrent UTIs, lower pole stone ball-valving symptoms, left adrenal adenoma, right-sided kidney fullness. Likely peripelvic cyst on the right side. Agrees to followup in three to six months with renal ultrasound. Agrees to a CT IVP if things are worse. Agrees to hold off otherwise. Agrees to call for symptoms in the interim. Agrees to recheck urine on followup. Transcribed by MOP/el      PAST MEDICAL HISTORY:    Allergies: PENICILLIN (Severe)  CODEINE (Severe)  DENIES: Latex, Shellfish, X-Ray Dye, Iodine.      Medications:     Problems: 591 Hydronephrosis (ICD-591) (EMJ33-A80.30)  Adrenal mass (ICD-255.9) (BMI51-P76.8)  Renal stone (ICD-592.0) (HVF98-S15.0)  Flank pain (ICD-789.09) (WGP98-T00.9)  599.71 GROSS HEMATURIA (ICD-599.71) (TEQ86-N76.0)  UTI (ICD-599.0) (MHT56-A15.0)  URETERAL STONE (ICD-592.1) (WSQ43-V55.1)    Illnesses: Kidney Problems and Cancer. DENIES: Heart Disease, Pacemaker/Defibrillator, Lung Disease, Diabetes, High Blood Pressure, Bowel Problems, Stroke/Seizure, Bleeding Problems, HIV, or Hepatitis. Surgeries: DENIES pertinent  surgeries      Family History: DENIES: Kidney cancer, Kidney disease, Kidney stones, Breast cancer, Uterine cancer, Cervical cancer, Ovarian cancer. Social History: Full Time. Other. Smoking status: former smoker. Does not drink alcohol. System Review: Admits to: None. DENIES: Unexplained Weight Loss, Dry Eyes, Dry Mouth, Leg Swelling, Shortness of Breath, Constipation, Involuntary Urine Loss, Lower Extremity Weakness, Dry Skin, Difficulty Walking, Psychiatric Problems, Impaired Sex Drive, Easy Bleeding, Rash.          URINALYSIS from Voided specimen  Urine Dip: pH: 6.0, Bld: Mod NH, LE: +, Nit: Neg, Prot: Neg, Ket: Neg, Gluc: Neg  Urine Micro: WBC: 1-2, RBC: 0, Bacteria: 0  Comment: Rare RBC        cc: Suma Silvestre MD  Transcribed by Speech [employer] Text [employer]  [employer]  E&M Service, Urinalysis Microscopic        Electronically signed by Micaela Arango on 2021 at 11:32 AM    Electronically signed by Rachel Wray MD on 2021 at 12:09 PM  ________________________________________________________________________      Temp (24hrs), Av °F (36.7 °C), Min:98 °F (36.7 °C), Max:98 °F (36.7 °C)       Creatinine   Date/Time Value Ref Range Status   2021 03:51 AM 0.90 0.55 - 1.02 MG/DL Final   2016 05:10 PM 0.98 0.55 - 1.02 MG/DL Final   2012 08:19 AM 0.7 0.6 - 1.3 MG/DL Final   2012 07:40 AM 0.8 0.6 - 1.3 MG/DL Final   2011 10:20 AM 0.79 0.57 - 1.00 mg/dL Final Current Antimicrobial Therapy (168h ago, onward)    None        Key Anti-Platelet Anticoagulant Meds     The patient is on no antiplatelet meds or anticoagulants. Diet: DIET NPO -       Labs     Lab Results   Component Value Date/Time    WBC 14.1 (H) 08/31/2021 03:51 AM    HCT 34.2 (L) 08/31/2021 03:51 AM    PLATELET 448 44/43/5868 03:51 AM    Sodium 135 (L) 08/31/2021 03:51 AM    Potassium 2.8 (L) 08/31/2021 03:51 AM    Chloride 98 08/31/2021 03:51 AM    CO2 24 08/31/2021 03:51 AM    BUN 20 08/31/2021 03:51 AM    Creatinine 0.90 08/31/2021 03:51 AM    Glucose 154 (H) 08/31/2021 03:51 AM    Calcium 9.3 08/31/2021 03:51 AM     UA:   Lab Results   Component Value Date/Time    Color YELLOW/STRAW 08/31/2021 06:26 AM    Appearance HAZY (A) 08/31/2021 06:26 AM    Specific gravity 1.025 08/31/2021 06:26 AM    Specific gravity 1.028 05/01/2016 05:10 PM    pH (UA) 6.0 08/31/2021 06:26 AM    Protein 100 (A) 08/31/2021 06:26 AM    Glucose Negative 08/31/2021 06:26 AM    Ketone >80 (A) 08/31/2021 06:26 AM    Bilirubin Negative 08/31/2021 06:26 AM    Urobilinogen 1.0 08/31/2021 06:26 AM    Nitrites Positive (A) 08/31/2021 06:26 AM    Leukocyte Esterase LARGE (A) 08/31/2021 06:26 AM    Epithelial cells FEW 08/31/2021 06:26 AM    Bacteria 4+ (A) 08/31/2021 06:26 AM    WBC >100 (H) 08/31/2021 06:26 AM    RBC  08/31/2021 06:26 AM     Imaging     Results for orders placed during the hospital encounter of 08/31/21    CT ABD PELV WO CONT    Narrative  INDICATION: left flank pain    COMPARISON: May 1, 2016    TECHNIQUE:  Noncontrast thin axial images were obtained through the abdomen and pelvis. Coronal and sagittal reconstructions were generated. CT dose reduction was  achieved through use of a standardized protocol tailored for this examination  and automatic exposure control for dose modulation. FINDINGS:  LUNG BASES: No abnormality. LIVER: No mass or biliary dilatation. GALLBLADDER: Unremarkable.   SPLEEN: No enlargement or lesion. PANCREAS: No mass or ductal dilatation. ADRENALS: 2 cm left adrenal hypodense nodule unchanged consistent with adenoma. KIDNEYS: Right hydroureteronephrosis due to a 5 mm mid ureteral calculus at the  level of L5. Several nonobstructing punctate left renal calculi. GI TRACT:  No bowel obstruction. Difficult to assess bowel wall thickening given  lack of oral contrast material.  PERITONEUM: No free air or free fluid. APPENDIX: Unremarkable. RETROPERITONEUM: No aortic aneurysm. LYMPH NODES:  None enlarged. ADDITIONAL COMMENTS: N/A.    URINARY BLADDER: Unremarkable. REPRODUCTIVE ORGANS: Fibroid uterus. LYMPH NODES:  None enlarged. FREE FLUID:  None. BONES: No destructive bone lesion. ADDITIONAL COMMENTS: N/A. Impression  1. Right hydronephrosis due to a 5 mm mid ureteral calculus. 2. Punctate nonobstructing left renal calculi. 3. Fibroid uterus. US Results (most recent):  Results from [place] encounter on 01/03/12    US ENDOVAGINAL    Narrative  **Final Report**      ICD Codes / Adm. Diagnosis: 589382   / Flank Pain  Examination:  US TRANSVAGINAL NON OB  - [id number] - [place]  3 2012 10:49AM  Accession No:  [id number]  Reason:  LT SIDED PAIN      REPORT:  INDICATION:  Pelvic pain. EXAM: PELVIC AND TRANSVAGINAL  ULTRASONOGRAPHY. COMPARISON: None  . PROCEDURE: The pelvis was scanned via high resolution real-time linear array  sonography, using both the transabdominal and transvaginal approach, which  was required for delineation of the endometrium and ovaries  . The  transabdominal imaging was unsuccessful due to an empty urinary bladder. Transvaginal examination was suboptimal due to the patient's inability to  tolerate the examination. FINDINGS TRANSABDOMINAL:  6. the uterus and ovaries cannot be visualized due  to an empty urinary bladder. FINDINGS TRANSVAGINAL:  The uterus shows overall size of 6.7 x 3.0 x 6.0 cm.   Imaging in the coronal plane suggests multiple lobular uterine masses versus  a possible bicornuate uterus, but this determination is difficult. Imaging  in the sagittal plane was not tolerated by the patient. The central  endometrium has not been well characterized. There is no endometrial mass or  fluid collection . Further evaluation of the ovaries transvaginally reveals the ovaries cannot  be visualized due to large uterine size. IMPRESSION:  1. Transabdominal  pelvic ultrasound revealing nonvisualization of the  uterus and ovaries due to an empty urinary bladder. .  2. Transvaginal pelvic ultrasound revealing technical difficulty due to the  patient's inability tolerate transvaginal examination. Therefore, the uterus  and endometrium have not been well-characterized. There are probably  multiple uterine myomata, but uterine anomaly is not excluded. 3. Correlate with serum beta-hCG if appropriate to determine the need for  urgent further imaging. Signing/Reading Doctor: Paula Naylor ([id number])  Approved: Paula Naylor ([id number])  01/04/2012      Cultures     All Micro Results     Procedure Component Value Units Date/Time    URINE CULTURE HOLD SAMPLE [[id number]] Collected: 08/31/21 0626    Order Status: Completed Specimen: Urine from Serum Updated: 08/31/21 0649     Urine culture hold       Urine on hold in Microbiology dept for 2 days. If unpreserved urine is submitted, it cannot be used for addtional testing after 24 hours, recollection will be required.           CULTURE, BLOOD, PAIRED [[id number]] Collected: 08/31/21 0530    Order Status: Completed Specimen: Blood Updated: 08/31/21 0559    CULTURE, URINE [[id number]]     Order Status: Sent Specimen: Urine from Clean catch            Past History: (Complete 2+/3 areas)     Allergies   Allergen Reactions    Codeine Nausea and Vomiting    Pcn [Penicillins] Nausea and Vomiting      Current Facility-Administered Medications   Medication Dose Route Frequency    potassium chloride 10 mEq in 100 ml IVPB  10 mEq IntraVENous NOW    sodium chloride (NS) flush 5-40 mL  5-40 mL IntraVENous Q8H    sodium chloride (NS) flush 5-40 mL  5-40 mL IntraVENous PRN    acetaminophen (TYLENOL) tablet 650 mg  650 mg Oral Q6H PRN    Or    acetaminophen (TYLENOL) suppository 650 mg  650 mg Rectal Q6H PRN    polyethylene glycol (MIRALAX) packet 17 g  17 g Oral DAILY PRN    ondansetron (ZOFRAN ODT) tablet 4 mg  4 mg Oral Q8H PRN    Or    ondansetron (ZOFRAN) injection 4 mg  4 mg IntraVENous Q6H PRN     Current Outpatient Medications   Medication Sig    ondansetron (ZOFRAN ODT) 4 mg disintegrating tablet Take 4 mg by mouth every eight (8) hours as needed for Nausea.  ondansetron (ZOFRAN ODT) 4 mg disintegrating tablet Take 1 Tab by mouth every eight (8) hours as needed for Nausea. Prior to Admission medications    Medication Sig Start Date End Date Taking? Authorizing Provider   ondansetron (ZOFRAN ODT) 4 mg disintegrating tablet Take 4 mg by mouth every eight (8) hours as needed for Nausea. Provider, Historical   ondansetron (ZOFRAN ODT) 4 mg disintegrating tablet Take 1 Tab by mouth every eight (8) hours as needed for Nausea. 5/1/16   Per Keating MD        PMHx:  has a past medical history of Breast cancer, stage 1 ([facility] [address] (2007), Calculus of kidney, Cancer ([facility] [address] and Cigarette smoker. PSurgHx:  has a past surgical history that includes endoscopy, colon, diagnostic; hx breast lumpectomy (Right, 2007); and hx lithotripsy. PSocHx:  reports that she has been smoking. She has never used smokeless tobacco. She reports current alcohol use. She reports that she does not use drugs.    ROS:  (Complete - 10 systems) - DENIES: Weightloss (Constitutional), Dry mouth (ENMT), Chest pain (CV), SOB (Respiratory), Constipation (GI), Weakness (MS), Pallor (Skin), TIA Sx (Neuro), Confusion (Psych), Easy bruising (Heme)    Physical Exam: (Comprehesive - 8+ 1995 Systems)     (1) Constitutional:  NAD; pleasant  FIO2:   on SpO2: O2 Sat (%): 98 %  O2 Device: None (Room air)    Patient Vitals for the past 24 hrs:   BP Temp Pulse Resp SpO2   08/31/21 0320 (!) 151/77 98 °F (36.7 °C) 72 16 98 %          (2) ENMT:   moist mucous membranes, normal sinuses   (3) Respiratory:  breathing easily, no distress   (4) GI:  no abdominal masses, LUQ tenderness   (5) :   Voiding independently, dark yellow UA, left CVA tenderness, mild right CVA tenderness   (6) Lymphatic:  no adenopathy, neck supple   (7) Muscloskeletal:  no gross deformity, normal ROM   (8) Skin:  no rash, warm & dry   (9) Neuro:  Alert, no focal deficits, normal speech      Signed By: Alla Sandoval, NP  - August 31, 2021

## 2021-08-31 NOTE — ANESTHESIA POSTPROCEDURE EVALUATION
Post-Anesthesia Evaluation and Assessment    Patient: Andres Grier MRN: [id number]  SSN: [id number]    YOB: 1960  Age: 61 y.o. Sex: female      I have evaluated the patient and they are stable and ready for discharge from the PACU. Cardiovascular Function/Vital Signs  Visit Vitals  /76   Pulse 66   Temp 36.7 °C (98.1 °F)   Resp 17   SpO2 97%       Patient is status post General anesthesia for Procedure(s):  CYSTO RIGHT STENT/ LEFT RETROGRADES/ ER. Nausea/Vomiting: None    Postoperative hydration reviewed and adequate. Pain:  Pain Scale 1: Numeric (0 - 10) (08/31/21 1150)  Pain Intensity 1: 0 (08/31/21 1150)   Managed    Neurological Status:   Neuro (WDL): Within Defined Limits (08/31/21 1150)  Neuro  LUE Motor Response: Purposeful (08/31/21 1150)  LLE Motor Response: Purposeful (08/31/21 1150)  RUE Motor Response: Purposeful (08/31/21 1150)  RLE Motor Response: Purposeful (08/31/21 1150)   At baseline    Mental Status, Level of Consciousness: Alert and  oriented to person, place, and time    Pulmonary Status:   O2 Device: None (Room air) (08/31/21 1155)   Adequate oxygenation and airway patent    Complications related to anesthesia: None    Post-anesthesia assessment completed. No concerns    Signed By: Flavia Johns MD     August 31, 2021              Procedure(s):  CYSTO RIGHT STENT/ LEFT RETROGRADES/ ER. general    <BSHSIANPOST>    INITIAL Post-op Vital signs:   Vitals Value Taken Time   /73 08/31/21 1215   Temp 36.7 °C (98.1 °F) 08/31/21 1150   Pulse 68 08/31/21 1216   Resp 18 08/31/21 1216   SpO2 97 % 08/31/21 1216   Vitals shown include unvalidated device data.

## 2021-08-31 NOTE — PROGRESS NOTES
TRANSFER - IN REPORT:    Verbal report received from Hilary Camarena RN (name) on [facility]  being received from PACU (unit) for routine post - op      Report consisted of patients Situation, Background, Assessment and   Recommendations(SBAR). Information from the following report(s) SBAR was reviewed with the receiving nurse. Opportunity for questions and clarification was provided. Assessment completed upon patients arrival to unit and care assumed.

## 2021-08-31 NOTE — PERIOP NOTES
[facility]   SIZE 6F X 24CM  Ref [id number]  [id number]  lOT [id number]  EXP [id number]

## 2021-09-01 LAB
ANION GAP SERPL CALC-SCNC: 6 MMOL/L (ref 5–15)
BUN SERPL-MCNC: 15 MG/DL (ref 6–20)
BUN/CREAT SERPL: 19 (ref 12–20)
CALCIUM SERPL-MCNC: 8.1 MG/DL (ref 8.5–10.1)
CHLORIDE SERPL-SCNC: 104 MMOL/L (ref 97–108)
CO2 SERPL-SCNC: 26 MMOL/L (ref 21–32)
CREAT SERPL-MCNC: 0.78 MG/DL (ref 0.55–1.02)
ERYTHROCYTE [DISTWIDTH] IN BLOOD BY AUTOMATED COUNT: 13.6 % (ref 11.5–14.5)
GLUCOSE SERPL-MCNC: 134 MG/DL (ref 65–100)
HCT VFR BLD AUTO: 28.4 % (ref 35–47)
HGB BLD-MCNC: 9.5 G/DL (ref 11.5–16)
MCH RBC QN AUTO: 28.8 PG (ref 26–34)
MCHC RBC AUTO-ENTMCNC: 33.5 G/DL (ref 30–36.5)
MCV RBC AUTO: 86.1 FL (ref 80–99)
NRBC # BLD: 0 K/UL (ref 0–0.01)
NRBC BLD-RTO: 0 PER 100 WBC
PLATELET # BLD AUTO: 270 K/UL (ref 150–400)
PMV BLD AUTO: 10.8 FL (ref 8.9–12.9)
POTASSIUM SERPL-SCNC: 3.1 MMOL/L (ref 3.5–5.1)
RBC # BLD AUTO: 3.3 M/UL (ref 3.8–5.2)
SODIUM SERPL-SCNC: 136 MMOL/L (ref 136–145)
WBC # BLD AUTO: 14.2 K/UL (ref 3.6–11)

## 2021-09-01 PROCEDURE — 85027 COMPLETE CBC AUTOMATED: CPT

## 2021-09-01 PROCEDURE — 74011250637 HC RX REV CODE- 250/637: Performed by: INTERNAL MEDICINE

## 2021-09-01 PROCEDURE — 74011000258 HC RX REV CODE- 258: Performed by: INTERNAL MEDICINE

## 2021-09-01 PROCEDURE — 36415 COLL VENOUS BLD VENIPUNCTURE: CPT

## 2021-09-01 PROCEDURE — 65270000029 HC RM PRIVATE

## 2021-09-01 PROCEDURE — 74011250636 HC RX REV CODE- 250/636: Performed by: INTERNAL MEDICINE

## 2021-09-01 PROCEDURE — 74011250636 HC RX REV CODE- 250/636: Performed by: UROLOGY

## 2021-09-01 PROCEDURE — 74011250637 HC RX REV CODE- 250/637: Performed by: FAMILY MEDICINE

## 2021-09-01 PROCEDURE — 80048 BASIC METABOLIC PNL TOTAL CA: CPT

## 2021-09-01 RX ORDER — POTASSIUM CHLORIDE 750 MG/1
20 TABLET, FILM COATED, EXTENDED RELEASE ORAL DAILY
Status: COMPLETED | OUTPATIENT
Start: 2021-09-01 | End: 2021-09-03

## 2021-09-01 RX ADMIN — POTASSIUM CHLORIDE, DEXTROSE MONOHYDRATE AND SODIUM CHLORIDE 100 ML/HR: 150; 5; 450 INJECTION, SOLUTION INTRAVENOUS at 21:10

## 2021-09-01 RX ADMIN — ONDANSETRON HYDROCHLORIDE 4 MG: 2 INJECTION, SOLUTION INTRAMUSCULAR; INTRAVENOUS at 03:26

## 2021-09-01 RX ADMIN — ACETAMINOPHEN 650 MG: 325 TABLET ORAL at 04:34

## 2021-09-01 RX ADMIN — ONDANSETRON HYDROCHLORIDE 4 MG: 2 INJECTION, SOLUTION INTRAMUSCULAR; INTRAVENOUS at 09:59

## 2021-09-01 RX ADMIN — Medication 10 ML: at 14:00

## 2021-09-01 RX ADMIN — Medication 10 ML: at 21:10

## 2021-09-01 RX ADMIN — CEFTRIAXONE SODIUM 1 G: 1 INJECTION, POWDER, FOR SOLUTION INTRAMUSCULAR; INTRAVENOUS at 21:11

## 2021-09-01 RX ADMIN — ONDANSETRON HYDROCHLORIDE 4 MG: 2 INJECTION, SOLUTION INTRAMUSCULAR; INTRAVENOUS at 21:16

## 2021-09-01 RX ADMIN — POTASSIUM CHLORIDE 20 MEQ: 750 TABLET, FILM COATED, EXTENDED RELEASE ORAL at 13:58

## 2021-09-01 RX ADMIN — ONDANSETRON HYDROCHLORIDE 4 MG: 2 INJECTION, SOLUTION INTRAMUSCULAR; INTRAVENOUS at 16:00

## 2021-09-01 RX ADMIN — Medication 3 MG: at 20:57

## 2021-09-01 NOTE — PROGRESS NOTES
TRANSITION OF CARE  RUR--8%  Disposition--Return home to independent functioning. Transport--Friend    Patient's primary family contact: friend Maggie Sic Management Interventions  PCP Verified by CM: Yes  Transition of Care Consult (CM Consult):  Other (None)  Physical Therapy Consult: No  Occupational Therapy Consult: No  Speech Therapy Consult: No  Current Support Network: Lives Alone  Confirm Follow Up Transport: Friends  The Plan for Transition of Care is Related to the Following Treatment Goals : Return to independent functioning  Discharge Location  Discharge Placement: Home    Reason for Admission:  Acute pyelonephritis            Right hydro nephrosis with obstructing nephrolithiasis            S/P 8/31/21 cysto right stent/left retrograde            Hypokalemia                   RUR Score:            8%           Plan for utilizing home health:  None        PCP: First and Last name:  Kinza Moise MD   Name of Practice:    Are you a current patient: Yes    Approximate date of last visit:    Can you participate in a virtual visit with your PCP:                     Current Advanced Directive/Advance Care Plan: Full Code      Healthcare Decision Maker:   Click here to complete [address] including selection of the Healthcare Decision Maker Relationship (ie \"Primary\")                             T

## 2021-09-01 NOTE — PROGRESS NOTES
Progress Note    Patient: Melchor Salinas MRN: [id number]  SSN: [id number]    YOB: 1960  Age: 61 y.o. Sex: female        ADMITTED:  2021 to Lory Alpers, MD  for Nephrolithiasis [N20.0]         Melchor Salinas is 1 Day Post-Op Procedure(s):  CYSTO RIGHT STENT/ LEFT RETROGRADES/ ER. She is doing well. Main complaint is difficulty sleeping overnight. Reports resolution of her headache and improvement in nausea. She is tolerating PO water intake. Notes improvement in left flank pain, attributes the pain to N/V for days. Denies right flank pain or stent colic. Admits urinary frequency. Denies dysuria or hematuria. Clear yellow UA in commode. AF, VSS. WBC unchanged, 14.2. Hgb 9.5. Cr improved, 0.78. BCx NGTD. UCx pending. +Rocephin. Vitals:  Temp (24hrs), Av.5 °F (36.9 °C), Min:98.1 °F (36.7 °C), Max:99 °F (37.2 °C)     Blood pressure (!) 144/72, pulse 63, temperature 98.3 °F (36.8 °C), resp. rate 16, last menstrual period 2002, SpO2 96 %. I&O's:   1901 -  0700  In: 1111.7 [P.O.:200; I.V.:911.7]  Out: -    No intake/output data recorded.      Exam:   Physical Exam  General: NAD, pleasant  Respiratory: no distress, breathing easily, room air  Abdomen: soft, no distention;mild LUQ tenderness to palpation  : no CVA tenderness, voiding independently, clear yellow UA  Neuro: Appropriate, no focal neurological deficits  Skin: warm, dry  Extremities: moves all, full ROM     Labs:   Recent Labs     21  0402 21  0351   WBC 14.2* 14.1*   HGB 9.5* 11.3*   HCT 28.4* 34.2*    310     Recent Labs     21  0402 21  0351    135*   K 3.1* 2.8*    98   CO2 26 24   * 154*   BUN 15 20   CREA 0.78 0.90   CA 8.1* 9.3        Cultures:      Imaging:       Assessment:     - 1 Day Post-Op Procedure(s):  CYSTO RIGHT STENT/ LEFT RETROGRADES/ ER    Active Problems:    Nephrolithiasis (8/31/2021)    left flank pain    Pyelonephritis     Right 5 mm mid ureteral stone s/p stent    Plan:     - Daily labs. - Follow UCx, then culture directed therapy. - Okay to eat. - Outpatient follow up with [facility] after discharge for definitive stone treatment, will arrange. She understands the importance of follow-up. Will sign off. Please call for further questions.    Supervising MD, Dr. Fam Hard  Signed By: Jeff Blevins NP - September 1, 2021

## 2021-09-01 NOTE — PROGRESS NOTES
[facility]  Hospitalist Group                                                                                          Hospitalist Progress Note  Anil Meza MD  Answering service: [phone] OR [phone] from in house phone              Progress Note    Patient: Barbara Schneider MRN: [id number]  SSN: [id number]    YOB: 1960  Age: 61 y.o. Sex: female      Admit Date: 8/31/2021    LOS: 1 day     Subjective:     Patient presents with nausea, vomiting and flank pain. Patient was found to have acute pyelonephritis and right hydronephrosis, patient was found to have right ureteral stone and patient underwent right ureteral stent placement. She is feeling better, not a lot of p.o. intake yet. Denies headache, dizziness, chest pain, shortness of breath, nausea, vomiting, abdominal pain. Objective:     Vitals:    08/31/21 1743 08/31/21 2000 09/01/21 0030 09/01/21 0730   BP: 128/68  116/70 (!) 144/72   Pulse: 76 74 72 63   Resp: 18 16 16 16   Temp: 99 °F (37.2 °C) 98.8 °F (37.1 °C) 98.8 °F (37.1 °C) 98.3 °F (36.8 °C)   SpO2: 95% 97% 97% 96%        Intake and Output:  Current Shift: No intake/output data recorded. Last three shifts: 08/30 1901 - 09/01 0700  In: 1111.7 [P.O.:200; I.V.:911.7]  Out: -     Physical Exam:   GENERAL: alert, cooperative, no distress, appears stated age  THROAT & NECK: normal and no erythema or exudates noted. LUNG: clear to auscultation bilaterally  HEART: regular rate and rhythm, S1, S2 normal, no murmur, click, rub or gallop  ABDOMEN: soft, non-tender. Bowel sounds normal. No masses,  no organomegaly  EXTREMITIES:  extremities normal, atraumatic, no cyanosis or edema  SKIN: no rash or abnormalities  NEUROLOGIC: AOx3  PSYCHIATRIC: non focal    Lab/Data Review: All lab results for the last 24 hours reviewed.      Recent Results (from the past 24 hour(s))   METABOLIC PANEL, BASIC    Collection Time: 09/01/21  4:02 AM   Result Value Ref Range Sodium 136 136 - 145 mmol/L    Potassium 3.1 (L) 3.5 - 5.1 mmol/L    Chloride 104 97 - 108 mmol/L    CO2 26 21 - 32 mmol/L    Anion gap 6 5 - 15 mmol/L    Glucose 134 (H) 65 - 100 mg/dL    BUN 15 6 - 20 MG/DL    Creatinine 0.78 0.55 - 1.02 MG/DL    BUN/Creatinine ratio 19 12 - 20      GFR est AA >60 >60 ml/min/1.73m2    GFR est non-AA >60 >60 ml/min/1.73m2    Calcium 8.1 (L) 8.5 - 10.1 MG/DL   CBC W/O DIFF    Collection Time: 09/01/21  4:02 AM   Result Value Ref Range    WBC 14.2 (H) 3.6 - 11.0 K/uL    RBC 3.30 (L) 3.80 - 5.20 M/uL    HGB 9.5 (L) 11.5 - 16.0 g/dL    HCT 28.4 (L) 35.0 - 47.0 %    MCV 86.1 80.0 - 99.0 FL    MCH 28.8 26.0 - 34.0 PG    MCHC 33.5 30.0 - 36.5 g/dL    RDW 13.6 11.5 - 14.5 %    PLATELET 561 873 - 569 K/uL    MPV 10.8 8.9 - 12.9 FL    NRBC 0.0 0  WBC    ABSOLUTE NRBC 0.00 0.00 - 0.01 K/uL        Imaging:    No results found. Assessment and Plan:     Acute pyelonephritis  -Likely due to obstructive uropathy from ureteral stone, s/p stent placement 8/31   -Urine culture grew gram-negative rods, blood cultures negative  -Continue Rocephin and follow final cultures    Right hydronephrosis  -Due to obstructive uropathy with 5 mm mid ureteral calculus  -Patient is s/p right ureteral stent placement 8/31  -Urology following, need outpatient follow-up for stone management    Hypokalemia  -Continue potassium replacement    Anemia  -Likely postoperative  -Monitor H&H    Elevated blood sugars  -No history of diabetes, check hemoglobin A1c in a.m. Discharge disposition: Likely in the next 24 to 48 hours.     Signed By: Bebo Romero MD     September 1, 2021

## 2021-09-02 LAB
ANION GAP SERPL CALC-SCNC: 6 MMOL/L (ref 5–15)
BACTERIA SPEC CULT: ABNORMAL
BASOPHILS # BLD: 0 K/UL (ref 0–0.1)
BASOPHILS NFR BLD: 0 % (ref 0–1)
BUN SERPL-MCNC: 10 MG/DL (ref 6–20)
BUN/CREAT SERPL: 15 (ref 12–20)
CALCIUM SERPL-MCNC: 8.3 MG/DL (ref 8.5–10.1)
CC UR VC: ABNORMAL
CHLORIDE SERPL-SCNC: 103 MMOL/L (ref 97–108)
CO2 SERPL-SCNC: 27 MMOL/L (ref 21–32)
CREAT SERPL-MCNC: 0.68 MG/DL (ref 0.55–1.02)
DIFFERENTIAL METHOD BLD: ABNORMAL
EOSINOPHIL # BLD: 0 K/UL (ref 0–0.4)
EOSINOPHIL NFR BLD: 0 % (ref 0–7)
ERYTHROCYTE [DISTWIDTH] IN BLOOD BY AUTOMATED COUNT: 13.9 % (ref 11.5–14.5)
EST. AVERAGE GLUCOSE BLD GHB EST-MCNC: 123 MG/DL
GLUCOSE SERPL-MCNC: 105 MG/DL (ref 65–100)
HBA1C MFR BLD: 5.9 % (ref 4–5.6)
HCT VFR BLD AUTO: 28.3 % (ref 35–47)
HGB BLD-MCNC: 9.7 G/DL (ref 11.5–16)
IMM GRANULOCYTES # BLD AUTO: 0.4 K/UL (ref 0–0.04)
IMM GRANULOCYTES NFR BLD AUTO: 3 % (ref 0–0.5)
LACTATE SERPL-SCNC: 0.9 MMOL/L (ref 0.4–2)
LYMPHOCYTES # BLD: 1.7 K/UL (ref 0.8–3.5)
LYMPHOCYTES NFR BLD: 13 % (ref 12–49)
MCH RBC QN AUTO: 29.2 PG (ref 26–34)
MCHC RBC AUTO-ENTMCNC: 34.3 G/DL (ref 30–36.5)
MCV RBC AUTO: 85.2 FL (ref 80–99)
MONOCYTES # BLD: 1.2 K/UL (ref 0–1)
MONOCYTES NFR BLD: 9 % (ref 5–13)
NEUTS SEG # BLD: 9.8 K/UL (ref 1.8–8)
NEUTS SEG NFR BLD: 75 % (ref 32–75)
NRBC # BLD: 0 K/UL (ref 0–0.01)
NRBC BLD-RTO: 0 PER 100 WBC
PLATELET # BLD AUTO: 329 K/UL (ref 150–400)
PMV BLD AUTO: 10.4 FL (ref 8.9–12.9)
POTASSIUM SERPL-SCNC: 3.2 MMOL/L (ref 3.5–5.1)
RBC # BLD AUTO: 3.32 M/UL (ref 3.8–5.2)
RBC MORPH BLD: ABNORMAL
SERVICE CMNT-IMP: ABNORMAL
SODIUM SERPL-SCNC: 136 MMOL/L (ref 136–145)
WBC # BLD AUTO: 13.1 K/UL (ref 3.6–11)

## 2021-09-02 PROCEDURE — 74011000258 HC RX REV CODE- 258: Performed by: INTERNAL MEDICINE

## 2021-09-02 PROCEDURE — 36415 COLL VENOUS BLD VENIPUNCTURE: CPT

## 2021-09-02 PROCEDURE — 65270000029 HC RM PRIVATE

## 2021-09-02 PROCEDURE — 74011250637 HC RX REV CODE- 250/637: Performed by: INTERNAL MEDICINE

## 2021-09-02 PROCEDURE — 80048 BASIC METABOLIC PNL TOTAL CA: CPT

## 2021-09-02 PROCEDURE — 74011250636 HC RX REV CODE- 250/636: Performed by: INTERNAL MEDICINE

## 2021-09-02 PROCEDURE — 85025 COMPLETE CBC W/AUTO DIFF WBC: CPT

## 2021-09-02 PROCEDURE — 87040 BLOOD CULTURE FOR BACTERIA: CPT

## 2021-09-02 PROCEDURE — 74011250636 HC RX REV CODE- 250/636: Performed by: UROLOGY

## 2021-09-02 PROCEDURE — 74011250637 HC RX REV CODE- 250/637: Performed by: FAMILY MEDICINE

## 2021-09-02 PROCEDURE — 83036 HEMOGLOBIN GLYCOSYLATED A1C: CPT

## 2021-09-02 PROCEDURE — 83605 ASSAY OF LACTIC ACID: CPT

## 2021-09-02 RX ADMIN — POTASSIUM CHLORIDE, DEXTROSE MONOHYDRATE AND SODIUM CHLORIDE 100 ML/HR: 150; 5; 450 INJECTION, SOLUTION INTRAVENOUS at 18:10

## 2021-09-02 RX ADMIN — Medication 3 MG: at 20:23

## 2021-09-02 RX ADMIN — ONDANSETRON HYDROCHLORIDE 4 MG: 2 INJECTION, SOLUTION INTRAMUSCULAR; INTRAVENOUS at 22:29

## 2021-09-02 RX ADMIN — Medication 10 ML: at 22:30

## 2021-09-02 RX ADMIN — POTASSIUM CHLORIDE 20 MEQ: 750 TABLET, FILM COATED, EXTENDED RELEASE ORAL at 09:39

## 2021-09-02 RX ADMIN — CEFTRIAXONE SODIUM 1 G: 1 INJECTION, POWDER, FOR SOLUTION INTRAMUSCULAR; INTRAVENOUS at 22:30

## 2021-09-02 RX ADMIN — CEFTRIAXONE SODIUM 1 G: 1 INJECTION, POWDER, FOR SOLUTION INTRAMUSCULAR; INTRAVENOUS at 20:23

## 2021-09-02 RX ADMIN — Medication 10 ML: at 14:00

## 2021-09-02 NOTE — PROGRESS NOTES
[address] [facility] Adult  Hospitalist Group                                                                                          Hospitalist Progress Note  Komal Veliz MD  Answering service: [phone] OR [phone] from in house phone              Progress Note    Patient: Kimberly Luke MRN: [id number]  SSN: [id number]    YOB: 1960  Age: 61 y.o. Sex: female      Admit Date: 8/31/2021    LOS: 2 days     Subjective:     Patient presents with nausea, vomiting and flank pain. Patient was found to have acute pyelonephritis and right hydronephrosis, patient was found to have right ureteral stone and patient underwent right ureteral stent placement. She is feeling better, not a lot of p.o. intake yet. Denies headache, dizziness, chest pain, shortness of breath, nausea, vomiting, abdominal pain. Objective:     Vitals:    09/01/21 1642 09/01/21 2112 09/02/21 0042 09/02/21 0814   BP: 127/74 (!) 148/73 (!) 147/78 (!) 143/76   Pulse: 65 67 66 66   Resp: 16 16 16 16   Temp: 98.6 °F (37 °C) 97.7 °F (36.5 °C) 98.1 °F (36.7 °C) 98.1 °F (36.7 °C)   SpO2: 97% 95% 97% 97%        Intake and Output:  Current Shift: No intake/output data recorded. Last three shifts: 08/31 1901 - 09/02 0700  In: -   Out: 3000 [Urine:3000]    Physical Exam:   GENERAL: alert, cooperative, no distress, appears stated age  THROAT & NECK: normal and no erythema or exudates noted. LUNG: clear to auscultation bilaterally  HEART: regular rate and rhythm, S1, S2 normal, no murmur, click, rub or gallop  ABDOMEN: soft, non-tender. Bowel sounds normal. No masses,  no organomegaly  EXTREMITIES:  extremities normal, atraumatic, no cyanosis or edema  SKIN: no rash or abnormalities  NEUROLOGIC: AOx3  PSYCHIATRIC: non focal    Lab/Data Review: All lab results for the last 24 hours reviewed.      Recent Results (from the past 24 hour(s))   METABOLIC PANEL, BASIC    Collection Time: 09/02/21  4:04 AM   Result Value Ref Range Sodium 136 136 - 145 mmol/L    Potassium 3.2 (L) 3.5 - 5.1 mmol/L    Chloride 103 97 - 108 mmol/L    CO2 27 21 - 32 mmol/L    Anion gap 6 5 - 15 mmol/L    Glucose 105 (H) 65 - 100 mg/dL    BUN 10 6 - 20 MG/DL    Creatinine 0.68 0.55 - 1.02 MG/DL    BUN/Creatinine ratio 15 12 - 20      GFR est AA >60 >60 ml/min/1.73m2    GFR est non-AA >60 >60 ml/min/1.73m2    Calcium 8.3 (L) 8.5 - 10.1 MG/DL   CBC WITH AUTOMATED DIFF    Collection Time: 09/02/21  4:04 AM   Result Value Ref Range    WBC 13.1 (H) 3.6 - 11.0 K/uL    RBC 3.32 (L) 3.80 - 5.20 M/uL    HGB 9.7 (L) 11.5 - 16.0 g/dL    HCT 28.3 (L) 35.0 - 47.0 %    MCV 85.2 80.0 - 99.0 FL    MCH 29.2 26.0 - 34.0 PG    MCHC 34.3 30.0 - 36.5 g/dL    RDW 13.9 11.5 - 14.5 %    PLATELET 564 056 - 015 K/uL    MPV 10.4 8.9 - 12.9 FL    NRBC 0.0 0  WBC    ABSOLUTE NRBC 0.00 0.00 - 0.01 K/uL    NEUTROPHILS 75 32 - 75 %    LYMPHOCYTES 13 12 - 49 %    MONOCYTES 9 5 - 13 %    EOSINOPHILS 0 0 - 7 %    BASOPHILS 0 0 - 1 %    IMMATURE GRANULOCYTES 3 (H) 0.0 - 0.5 %    ABS. NEUTROPHILS 9.8 (H) 1.8 - 8.0 K/UL    ABS. LYMPHOCYTES 1.7 0.8 - 3.5 K/UL    ABS. MONOCYTES 1.2 (H) 0.0 - 1.0 K/UL    ABS. EOSINOPHILS 0.0 0.0 - 0.4 K/UL    ABS. BASOPHILS 0.0 0.0 - 0.1 K/UL    ABS. IMM. GRANS. 0.4 (H) 0.00 - 0.04 K/UL    DF SMEAR SCANNED      RBC COMMENTS NORMOCYTIC, NORMOCHROMIC     CULTURE, BLOOD, PAIRED    Collection Time: 09/02/21  4:04 AM    Specimen: Blood   Result Value Ref Range    Special Requests: NO SPECIAL REQUESTS      Culture result: NO GROWTH AFTER 2 HOURS          Imaging:    No results found.        Assessment and Plan:     Acute pyelonephritis  -Likely due to obstructive uropathy from ureteral stone, s/p stent placement 8/31   -Urine culture grew gram-negative rods  -Continue Rocephin and follow final cultures    Bacteremia  -Initial blood cultures 8/31 showing gram-negative rods in 1 of 2 bottles  -Repeated blood cultures today  -ID consulted    Right hydronephrosis  -Due to obstructive uropathy with 5 mm mid ureteral calculus  -Patient is s/p right ureteral stent placement 8/31  -Urology following, need outpatient follow-up for stone management    Hypokalemia  -Continue potassium replacement    Anemia  -Likely postoperative  -Monitor H&H    Elevated blood sugars  -No history of diabetes, check hemoglobin A1c    Discharge disposition: Likely in the next 24 to 48 hours.     Signed By: Zelda Curran MD     September 2, 2021

## 2021-09-03 VITALS
SYSTOLIC BLOOD PRESSURE: 161 MMHG | OXYGEN SATURATION: 98 % | HEART RATE: 65 BPM | DIASTOLIC BLOOD PRESSURE: 86 MMHG | RESPIRATION RATE: 16 BRPM | TEMPERATURE: 98.4 F

## 2021-09-03 LAB
ANION GAP SERPL CALC-SCNC: 7 MMOL/L (ref 5–15)
BASOPHILS # BLD: 0.1 K/UL (ref 0–0.1)
BASOPHILS NFR BLD: 1 % (ref 0–1)
BUN SERPL-MCNC: 9 MG/DL (ref 6–20)
BUN/CREAT SERPL: 16 (ref 12–20)
CALCIUM SERPL-MCNC: 8.4 MG/DL (ref 8.5–10.1)
CHLORIDE SERPL-SCNC: 101 MMOL/L (ref 97–108)
CO2 SERPL-SCNC: 26 MMOL/L (ref 21–32)
CREAT SERPL-MCNC: 0.58 MG/DL (ref 0.55–1.02)
DIFFERENTIAL METHOD BLD: ABNORMAL
EOSINOPHIL # BLD: 0.1 K/UL (ref 0–0.4)
EOSINOPHIL NFR BLD: 1 % (ref 0–7)
ERYTHROCYTE [DISTWIDTH] IN BLOOD BY AUTOMATED COUNT: 13.8 % (ref 11.5–14.5)
GLUCOSE SERPL-MCNC: 129 MG/DL (ref 65–100)
HCT VFR BLD AUTO: 30 % (ref 35–47)
HGB BLD-MCNC: 10.3 G/DL (ref 11.5–16)
IMM GRANULOCYTES # BLD AUTO: 0 K/UL
IMM GRANULOCYTES NFR BLD AUTO: 0 %
LYMPHOCYTES # BLD: 1.9 K/UL (ref 0.8–3.5)
LYMPHOCYTES NFR BLD: 17 % (ref 12–49)
MCH RBC QN AUTO: 29.4 PG (ref 26–34)
MCHC RBC AUTO-ENTMCNC: 34.3 G/DL (ref 30–36.5)
MCV RBC AUTO: 85.7 FL (ref 80–99)
MONOCYTES # BLD: 1 K/UL (ref 0–1)
MONOCYTES NFR BLD: 9 % (ref 5–13)
NEUTS BAND NFR BLD MANUAL: 6 % (ref 0–6)
NEUTS SEG # BLD: 7.9 K/UL (ref 1.8–8)
NEUTS SEG NFR BLD: 66 % (ref 32–75)
NRBC # BLD: 0 K/UL (ref 0–0.01)
NRBC BLD-RTO: 0 PER 100 WBC
PLATELET # BLD AUTO: 371 K/UL (ref 150–400)
PLATELET COMMENTS,PCOM: ABNORMAL
PMV BLD AUTO: 9.9 FL (ref 8.9–12.9)
POTASSIUM SERPL-SCNC: 3.1 MMOL/L (ref 3.5–5.1)
RBC # BLD AUTO: 3.5 M/UL (ref 3.8–5.2)
RBC MORPH BLD: ABNORMAL
SODIUM SERPL-SCNC: 134 MMOL/L (ref 136–145)
WBC # BLD AUTO: 11 K/UL (ref 3.6–11)

## 2021-09-03 PROCEDURE — 85025 COMPLETE CBC W/AUTO DIFF WBC: CPT

## 2021-09-03 PROCEDURE — 74011250637 HC RX REV CODE- 250/637: Performed by: FAMILY MEDICINE

## 2021-09-03 PROCEDURE — 36415 COLL VENOUS BLD VENIPUNCTURE: CPT

## 2021-09-03 PROCEDURE — 74011250636 HC RX REV CODE- 250/636: Performed by: UROLOGY

## 2021-09-03 PROCEDURE — 80048 BASIC METABOLIC PNL TOTAL CA: CPT

## 2021-09-03 PROCEDURE — 74011250637 HC RX REV CODE- 250/637: Performed by: INTERNAL MEDICINE

## 2021-09-03 RX ORDER — POTASSIUM CHLORIDE 750 MG/1
20 TABLET, FILM COATED, EXTENDED RELEASE ORAL 2 TIMES DAILY
Status: DISCONTINUED | OUTPATIENT
Start: 2021-09-03 | End: 2021-09-03 | Stop reason: HOSPADM

## 2021-09-03 RX ORDER — LEVOFLOXACIN 750 MG/1
750 TABLET ORAL DAILY
Qty: 11 TABLET | Refills: 0 | Status: SHIPPED | OUTPATIENT
Start: 2021-09-03 | End: 2021-11-23 | Stop reason: ALTCHOICE

## 2021-09-03 RX ORDER — POTASSIUM CHLORIDE 1500 MG/1
20 TABLET, FILM COATED, EXTENDED RELEASE ORAL DAILY
Qty: 5 TABLET | Refills: 0 | Status: SHIPPED | OUTPATIENT
Start: 2021-09-03 | End: 2021-11-23 | Stop reason: ALTCHOICE

## 2021-09-03 RX ADMIN — POTASSIUM CHLORIDE 20 MEQ: 750 TABLET, FILM COATED, EXTENDED RELEASE ORAL at 09:38

## 2021-09-03 RX ADMIN — POTASSIUM CHLORIDE, DEXTROSE MONOHYDRATE AND SODIUM CHLORIDE 100 ML/HR: 150; 5; 450 INJECTION, SOLUTION INTRAVENOUS at 13:36

## 2021-09-03 RX ADMIN — POTASSIUM CHLORIDE, DEXTROSE MONOHYDRATE AND SODIUM CHLORIDE 100 ML/HR: 150; 5; 450 INJECTION, SOLUTION INTRAVENOUS at 03:43

## 2021-09-03 RX ADMIN — ACETAMINOPHEN 650 MG: 325 TABLET ORAL at 04:40

## 2021-09-03 RX ADMIN — Medication 10 ML: at 14:57

## 2021-09-03 NOTE — CONSULTS
ID Consult Note  NAME:  Jane Orona   :   1960   MRN:   [id number]   Date/Time:  2021 8:59 PM  Subjective:   REASON FOR CONSULT: Bacteremia  Guanakito Sinha is a 61 y.o. with a history of stage I breast cancer and nephrolithiasis. The patient recently developed acute onset nausea vomiting and right flank pain. Pain was severe. It was nonradiating. There were no alleviating factors before she came to the hospital.  She had an episode of fever, but no chills. Here in the emergency room, a CT scan was done which showed right hydronephrosis with obstructing ureterolithiasis. She was brought to the operating room for cystoscopy and right stent placement by urology. Her urine culture is growing E. coli while her blood cultures are growing gram-negative rods in a single bottle. She is currently on ceftriaxone 1 g daily and we are being asked to see her in consult. She does not have a port.       Past Medical History:   Diagnosis Date    Breast cancer, stage 1 ([address]     Right    Calculus of kidney     Cancer [facility] [address]     Cigarette smoker       Past Surgical History:   Procedure Laterality Date    ENDOSCOPY, COLON, DIAGNOSTIC      HX BREAST LUMPECTOMY Right 2007    radiation    HX LITHOTRIPSY       Social History     Tobacco Use    Smoking status: Current Every Day Smoker    Smokeless tobacco: Never Used   Substance Use Topics    Alcohol use: Yes      Family History   Problem Relation Age of Onset   [employer] Stroke Mother     Heart Attack Mother     Coronary Artery Disease Mother     Heart Attack Father     Thyroid Disease Father     Coronary Artery Disease Father     Heart Attack Sister     High Cholesterol Sister     Coronary Artery Disease Sister     Heart Attack Brother     Coronary Artery Disease Brother       Allergies   Allergen Reactions    Codeine Nausea and Vomiting    Pcn [Penicillins] Nausea and Vomiting      Home Medications:  Prior to Admission Medications   Prescriptions Last Dose Informant Patient Reported? Taking?   ondansetron (ZOFRAN ODT) 4 mg disintegrating tablet   Yes No   Sig: Take 4 mg by mouth every eight (8) hours as needed for Nausea. ondansetron (ZOFRAN ODT) 4 mg disintegrating tablet 8/30/2021 at Unknown time  No Yes   Sig: Take 1 Tab by mouth every eight (8) hours as needed for Nausea.       Facility-Administered Medications: None     Hospital medications:  Current Facility-Administered Medications   Medication Dose Route Frequency    potassium chloride SR (KLOR-CON 10) tablet 20 mEq  20 mEq Oral DAILY    sodium chloride (NS) flush 5-40 mL  5-40 mL IntraVENous Q8H    sodium chloride (NS) flush 5-40 mL  5-40 mL IntraVENous PRN    acetaminophen (TYLENOL) tablet 650 mg  650 mg Oral Q6H PRN    Or    acetaminophen (TYLENOL) suppository 650 mg  650 mg Rectal Q6H PRN    polyethylene glycol (MIRALAX) packet 17 g  17 g Oral DAILY PRN    ondansetron (ZOFRAN ODT) tablet 4 mg  4 mg Oral Q8H PRN    Or    ondansetron (ZOFRAN) injection 4 mg  4 mg IntraVENous Q6H PRN    dextrose 5% - 0.45% NaCl with KCl 20 mEq/L infusion  100 mL/hr IntraVENous CONTINUOUS    cefTRIAXone (ROCEPHIN) 1 g in 0.9% sodium chloride (MBP/ADV) 50 mL MBP  1 g IntraVENous Q24H    melatonin tablet 3 mg  3 mg Oral QHS PRN     REVIEW OF SYSTEMS:        Const:   negative weight loss  Eyes:   negative diplopia or visual changes, negative eye pain  ENT:   negative coryza, negative sore throat  Resp:   negative cough, hemoptysis, dyspnea  Cards:  negative for chest pain, palpitations  :  She did have some hematuria  GI:   Negative for hematemesis and hematochezia  Skin:   negative for rash and pruritus  Heme:   negative for easy bruising and gum/nose bleeding  MS:  negative for myalgias, arthralgias  Neurolo:  negative for headaches, dizziness   Psych:  negative for hallucinations        Objective:   VITALS:    Visit Vitals  BP (!) 160/71   Pulse (!) 59   Temp 98.4 °F (36.9 °C)   Resp 16   LMP 06/07/2002 SpO2 97%     Temp (24hrs), Av.3 °F (36.8 °C), Min:97.7 °F (36.5 °C), Max:99.2 °F (37.3 °C)    PHYSICAL EXAM:   General:    Alert, cooperative, no distress, appears stated age. Head:   Normocephalic, without obvious abnormality, atraumatic. Eyes:   Conjunctivae clear, anicteric sclerae. Nose:  Nares normal.   Throat:    Lips and tongue normal.  No Thrush  Neck:  Supple, symmetrical    no carotid bruit and no JVD. :    No CVA tenderness, no barrios catheter  Lungs:   Clear to auscultation bilaterally. No Wheezing or Rhonchi. No rales. Heart:   Regular rate and rhythm,  no murmur, rub or gallop. Abdomen:   Soft, non-tender,not distended. Extremities: Knees, ankles, wrists, elbows are not warm and not tender. Skin:     No rashes or lesions.   Not Jaundiced  Lymph: Cervical normal  Neurologic:  no facial asymmetry, tongue midline muscle strength 5 out of 5  Psychiatric: Mood and affect normal  LAB DATA REVIEWED:    Recent Results (from the past 48 hour(s))   METABOLIC PANEL, BASIC    Collection Time: 21  4:02 AM   Result Value Ref Range    Sodium 136 136 - 145 mmol/L    Potassium 3.1 (L) 3.5 - 5.1 mmol/L    Chloride 104 97 - 108 mmol/L    CO2 26 21 - 32 mmol/L    Anion gap 6 5 - 15 mmol/L    Glucose 134 (H) 65 - 100 mg/dL    BUN 15 6 - 20 MG/DL    Creatinine 0.78 0.55 - 1.02 MG/DL    BUN/Creatinine ratio 19 12 - 20      GFR est AA >60 >60 ml/min/1.73m2    GFR est non-AA >60 >60 ml/min/1.73m2    Calcium 8.1 (L) 8.5 - 10.1 MG/DL   CBC W/O DIFF    Collection Time: 21  4:02 AM   Result Value Ref Range    WBC 14.2 (H) 3.6 - 11.0 K/uL    RBC 3.30 (L) 3.80 - 5.20 M/uL    HGB 9.5 (L) 11.5 - 16.0 g/dL    HCT 28.4 (L) 35.0 - 47.0 %    MCV 86.1 80.0 - 99.0 FL    MCH 28.8 26.0 - 34.0 PG    MCHC 33.5 30.0 - 36.5 g/dL    RDW 13.6 11.5 - 14.5 %    PLATELET 064 223 - 430 K/uL    MPV 10.8 8.9 - 12.9 FL    NRBC 0.0 0  WBC    ABSOLUTE NRBC 0.00 0.00 - 8.21 K/uL   METABOLIC PANEL, BASIC    Collection Time: 09/02/21  4:04 AM   Result Value Ref Range    Sodium 136 136 - 145 mmol/L    Potassium 3.2 (L) 3.5 - 5.1 mmol/L    Chloride 103 97 - 108 mmol/L    CO2 27 21 - 32 mmol/L    Anion gap 6 5 - 15 mmol/L    Glucose 105 (H) 65 - 100 mg/dL    BUN 10 6 - 20 MG/DL    Creatinine 0.68 0.55 - 1.02 MG/DL    BUN/Creatinine ratio 15 12 - 20      GFR est AA >60 >60 ml/min/1.73m2    GFR est non-AA >60 >60 ml/min/1.73m2    Calcium 8.3 (L) 8.5 - 10.1 MG/DL   CBC WITH AUTOMATED DIFF    Collection Time: 09/02/21  4:04 AM   Result Value Ref Range    WBC 13.1 (H) 3.6 - 11.0 K/uL    RBC 3.32 (L) 3.80 - 5.20 M/uL    HGB 9.7 (L) 11.5 - 16.0 g/dL    HCT 28.3 (L) 35.0 - 47.0 %    MCV 85.2 80.0 - 99.0 FL    MCH 29.2 26.0 - 34.0 PG    MCHC 34.3 30.0 - 36.5 g/dL    RDW 13.9 11.5 - 14.5 %    PLATELET 203 581 - 723 K/uL    MPV 10.4 8.9 - 12.9 FL    NRBC 0.0 0  WBC    ABSOLUTE NRBC 0.00 0.00 - 0.01 K/uL    NEUTROPHILS 75 32 - 75 %    LYMPHOCYTES 13 12 - 49 %    MONOCYTES 9 5 - 13 %    EOSINOPHILS 0 0 - 7 %    BASOPHILS 0 0 - 1 %    IMMATURE GRANULOCYTES 3 (H) 0.0 - 0.5 %    ABS. NEUTROPHILS 9.8 (H) 1.8 - 8.0 K/UL    ABS. LYMPHOCYTES 1.7 0.8 - 3.5 K/UL    ABS. MONOCYTES 1.2 (H) 0.0 - 1.0 K/UL    ABS. EOSINOPHILS 0.0 0.0 - 0.4 K/UL    ABS. BASOPHILS 0.0 0.0 - 0.1 K/UL    ABS. IMM.  GRANS. 0.4 (H) 0.00 - 0.04 K/UL    DF SMEAR SCANNED      RBC COMMENTS NORMOCYTIC, NORMOCHROMIC     CULTURE, BLOOD, PAIRED    Collection Time: 09/02/21  4:04 AM    Specimen: Blood   Result Value Ref Range    Special Requests: NO SPECIAL REQUESTS      Culture result: NO GROWTH AFTER 2 HOURS     HEMOGLOBIN A1C WITH EAG    Collection Time: 09/02/21  4:20 PM   Result Value Ref Range    Hemoglobin A1c 5.9 (H) 4.0 - 5.6 %    Est. average glucose 123 mg/dL   LACTIC ACID    Collection Time: 09/02/21  4:33 PM   Result Value Ref Range    Lactic acid 0.9 0.4 - 2.0 MMOL/L         IMPRESSION    #1 gram-negative bacteremia    #2 E. coli complicated UTI    #3 right hydronephrosis due to ureteral stone    #4 history of breast cancer    PLAN    Increase ceftriaxone to 2 g daily. Await identification of the blood isolate. If it is Levaquin sensitive, we can give her oral antibiotics when she is discharged.                      ___________________________________________________  ID: Giovanna Paul MD

## 2021-09-03 NOTE — PROGRESS NOTES
Physician Progress Note      PATIENT:               Saadia Suarez  CSN #:                  [id number]  :                       1960  ADMIT DATE:       2021 3:09 AM  DISCH DATE:  RESPONDING  PROVIDER #:        Abida Huynh MD        QUERY TEXT:    Type of Anemia: Please provide further specificity, if known. Clinical indicators include: rbc, hgb, hct, platelet, anemia, h&h, hemoglobin, normocytic, normochromic  Options provided:  -- Anemia due to acute blood loss  -- Anemia due to chronic blood loss  -- Anemia due to iron deficiency  -- Anemia due to postoperative blood loss  -- Anemia due to chronic disease  -- Other - I will add my own diagnosis  -- Disagree - Not applicable / Not valid  -- Disagree - Clinically Unable to determine / Unknown        PROVIDER RESPONSE TEXT:    The patient has anemia due to chronic disease.       Electronically signed by:  Abida Huynh MD 9/3/2021 4:21 PM

## 2021-09-03 NOTE — PROGRESS NOTES
Dc instructions discussed with pt and family  All questions answered and instructions for pt to take home po medications sent to pharmacy via md

## 2021-09-03 NOTE — DISCHARGE INSTRUCTIONS
Patient Education        Kidney Stone: Care Instructions  Your Care Instructions     Kidney stones are formed when salts, minerals, and other substances normally found in the urine clump together. They can be as small as grains of sand or, rarely, as large as golf balls. While the stone is traveling through the ureter, which is the tube that carries urine from the kidney to the bladder, you will probably feel pain. The pain may be mild or very severe. You may also have some blood in your urine. As soon as the stone reaches the bladder, any intense pain should go away. If a stone is too large to pass on its own, you may need a medical procedure to help you pass the stone. The doctor has checked you carefully, but problems can develop later. If you notice any problems or new symptoms, get medical treatment right away. Follow-up care is a key part of your treatment and safety. Be sure to make and go to all appointments, and call your doctor if you are having problems. It's also a good idea to know your test results and keep a list of the medicines you take. How can you care for yourself at home? · Drink plenty of fluids. If you have kidney, heart, or liver disease and have to limit fluids, talk with your doctor before you increase the amount of fluids you drink. · Take pain medicines exactly as directed. Call your doctor if you think you are having a problem with your medicine. ? If the doctor gave you a prescription medicine for pain, take it as prescribed. ? If you are not taking a prescription pain medicine, ask your doctor if you can take an over-the-counter medicine. Read and follow all instructions on the label. · Your doctor may ask you to strain your urine so that you can collect your kidney stone when it passes. You can use a kitchen strainer or a tea strainer to catch the stone. Store it in a plastic bag until you see your doctor again.   Preventing future kidney stones  Some changes in your diet may help prevent kidney stones. Depending on the cause of your stones, your doctor may recommend that you:  · Drink plenty of fluids, enough so that your urine is light yellow or clear like water. If you have kidney, heart, or liver disease and have to limit fluids, talk with your doctor before you increase the amount of fluids you drink. · Limit coffee, tea, and alcohol. Also avoid grapefruit juice. · Do not take more than the recommended daily dose of vitamins C and D.  · Avoid antacids such as Gaviscon, Maalox, Mylanta, or Tums. · Limit the amount of salt (sodium) in your diet. · Eat a balanced diet that is not too high in protein. · Limit foods that are high in a substance called oxalate, which can cause kidney stones. These foods include dark green vegetables, rhubarb, chocolate, wheat bran, nuts, cranberries, and beans. When should you call for help? Call your doctor now or seek immediate medical care if:    · You cannot keep down fluids.     · Your pain gets worse.     · You have a fever or chills.     · You have new or worse pain in your back just below your rib cage (the flank area).     · You have new or more blood in your urine. Watch closely for changes in your health, and be sure to contact your doctor if:    · You do not get better as expected. Where can you learn more? Go to http://www.gray.com/  Enter J944 in the search box to learn more about \"Kidney Stone: Care Instructions. \"  Current as of: December 17, 2020               Content Version: 12.8  © 2006-2021 [employer]. Care instructions adapted under license by [employer] (which disclaims liability or warranty for this information). If you have questions about a medical condition or this instruction, always ask your healthcare professional. [address] any warranty or liability for your use of this information.

## 2021-09-03 NOTE — PROGRESS NOTES
NUTRITION  Pt seen for:     []           Supplements  [x]             PO intake check   []           Food Allergies  []             Food Preferences/tolerances    []           Rescreen   []             Education    []           Diet order clarification []             Other            RECOMMENDATIONS:     Continue regular diet as ordered    RD has added Ensure High Protein TID, Magic Cup BID     SUBJECTIVE/OBJECTIVE:   Information obtained from:   Chart review        Diet:  regular  Supplements: Ensure High Protein, Magic Cup  Intake: []           Good     [x]           Fair      []           Poor   No data found.     Weight Changes:   []            Loss  []            Gain  [x]            Stable  Wt Readings from Last 10 Encounters:   05/01/16 78.7 kg (173 lb 8 oz)   01/03/12 81.6 kg (180 lb)   01/02/12 81.6 kg (180 lb)   09/07/11 74.8 kg (165 lb)       Nutrition Problems Identified:  [x]       None: not at nutrition risk  []           Specified food preferences   []           Dislikes supplements              []           Allergies  []           Difficulty chewing or swallowing   []           Poor dentition   []           Nausea/Vomiting  []           Constipation  []           Diarrhea    PLAN:   []           Obtained/adjusted food preferences/tolerances and/or snacks options   []           Dislikes supplements will try a substitution   []           Modify diet for food allergies  []           Adjust texture due to difficulty chewing   [x]    Continue current diet  []           Educated patient  [x]           Add Supplements  []          Rescreen      Gurjit Maldonado RD, CLAUDIO  Contact via [employer]

## 2021-09-03 NOTE — DISCHARGE SUMMARY
Discharge Summary       PATIENT ID: Sathya Lyon  MRN: [id number]   YOB: 1960    DATE OF ADMISSION: 8/31/2021  3:09 AM    DATE OF DISCHARGE: 09/03/2021   PRIMARY CARE PROVIDER: Bronson Ruffin MD     ATTENDING PHYSICIAN: Hai Carroll  DISCHARGING PROVIDER: Clayborne Ganser, MD    To contact this individual call [phone] and ask the  to page. If unavailable ask to be transferred the Adult Hospitalist Department. CONSULTATIONS: IP CONSULT TO UROLOGY  IP CONSULT TO INFECTIOUS DISEASES    PROCEDURES/SURGERIES: Procedure(s):  CYSTO RIGHT STENT/ LEFT RETROGRADES/ ER    ADMITTING DIAGNOSES & HOSPITAL COURSE:     HPI  Sathya Lyon is a 61 y.o. female w/hx of breast cancer and kidney stones presents to the hospital with nausea, vomiting, left flank pain. Patient was also having low grade fever, decreased urine output and generalized body aches.  She has no burning or frequency but did notice some blood in her urine and did a over-the-counter urine test and talked to her PCP who prescribed bactrim which she took one dose prior to hospital presentation. Hospital Course  Patient presents with acute pyelonephritis due to obstructive uropathy from ureteral stone. Patient also with right-sided hydronephrosis with a 5 mm mid ureteral calculus. Patient was started on IV Rocephin and urology evaluated the patient and ureteral stent was placed 8/31. Patient's urine culture grew E. coli and also initial blood cultures growing E. coli 1 of 2 bottles. Repeat blood cultures were negative. Patient was evaluated by ID. Later blood cultures came back with E. coli sensitive to Levaquin. Patient will be discharged to home on p.o. Levaquin for the remainder of the days to complete total of 14-day course. Patient needs to follow-up with urology as outpatient for definitive stone treatment. DISCHARGE DIAGNOSES / PLAN:      1. UTI  2. Bacteremia  3. Nephrolithiasis  4.  Right hydronephrosis  5. Anemia  6. Hypokalemia     ADDITIONAL CARE RECOMMENDATIONS:     Follow-up with urology as scheduled. PENDING TEST RESULTS:   At the time of discharge the following test results are still pending: None    FOLLOW UP APPOINTMENTS:    Follow-up Information     Follow up With Specialties Details Why [facility]   follow up on 9/13/21 at 3:40 PM with Dr. Avery Jimenez at the [facility] to discuss stone treatment. [phone] [address] [facility] [facility] [address]    Kinza Moise MD Family Medicine   [address] [address] [facility]  [address]  [facility]                DIET: Cardiac Diet  Oral Nutritional Supplements: No Oral Supplement prescribed    ACTIVITY: Activity as tolerated    WOUND CARE: None    EQUIPMENT needed: None      DISCHARGE MEDICATIONS:  Current Discharge Medication List      START taking these medications    Details   levoFLOXacin (Levaquin) 750 mg tablet Take 1 Tablet by mouth daily. Qty: 11 Tablet, Refills: 0  Start date: 9/3/2021      potassium chloride SR (K-TAB) 20 mEq tablet Take 1 Tablet by mouth daily. Qty: 5 Tablet, Refills: 0  Start date: 9/3/2021         CONTINUE these medications which have NOT CHANGED    Details   !! ondansetron (ZOFRAN ODT) 4 mg disintegrating tablet Take 1 Tab by mouth every eight (8) hours as needed for Nausea. Qty: 10 Tab, Refills: 0      !! ondansetron (ZOFRAN ODT) 4 mg disintegrating tablet Take 4 mg by mouth every eight (8) hours as needed for Nausea. !! - Potential duplicate medications found. Please discuss with provider. NOTIFY YOUR PHYSICIAN FOR ANY OF THE FOLLOWING:   Fever over 101 degrees for 24 hours. Chest pain, shortness of breath, fever, chills, nausea, vomiting, diarrhea, change in mentation, falling, weakness, bleeding. Severe pain or pain not relieved by medications. Or, any other signs or symptoms that you may have questions about.     DISPOSITION:    Home With:   OT  PT  HH  RN       Long term SNF/Inpatient Rehab    Independent/assisted living    Hospice    Other:       PATIENT CONDITION AT DISCHARGE:     Functional status    Poor     Deconditioned     Independent      Cognition     Lucid     Forgetful     Dementia      Catheters/lines (plus indication)    White     PICC     PEG     None      Code status     Full code     DNR      PHYSICAL EXAMINATION AT DISCHARGE:  General:          Alert, cooperative, no distress, appears stated age. HEENT:           Atraumatic, anicteric sclerae, pink conjunctivae                          No oral ulcers, mucosa moist, throat clear, dentition fair  Neck:               Supple, symmetrical  Lungs:             Clear to auscultation bilaterally. No Wheezing or Rhonchi. No rales. Chest wall:      No tenderness  No Accessory muscle use. Heart:              Regular  rhythm,  No  murmur   No edema  Abdomen:        Soft, non-tender. Not distended. Bowel sounds normal  Extremities:     No cyanosis. No clubbing,                            Skin turgor normal, Capillary refill normal  Skin:                Not pale. Not Jaundiced  No rashes   Psych:             Not anxious or agitated.   Neurologic:      Alert, moves all extremities, answers questions appropriately and responds to commands       CHRONIC MEDICAL DIAGNOSES:  Problem List as of 9/3/2021 Date Reviewed: 8/31/2021        Codes Class Noted - Resolved    Nephrolithiasis ICD-10-CM: N20.0  ICD-9-CM: 592.0  8/31/2021 - Present              Greater than 60 minutes were spent with the patient on counseling and coordination of care    Signed:   Kay Waldrop MD  9/3/2021  4:07 PM Oriented - self; Oriented - place; Oriented - time

## 2021-09-05 LAB
BACTERIA SPEC CULT: ABNORMAL
SERVICE CMNT-IMP: ABNORMAL

## 2021-09-07 LAB
BACTERIA SPEC CULT: NORMAL
SERVICE CMNT-IMP: NORMAL

## 2021-10-20 ENCOUNTER — OFFICE VISIT (OUTPATIENT)
Dept: INTERNAL MEDICINE CLINIC | Age: 61
End: 2021-10-20
Payer: COMMERCIAL

## 2021-10-20 VITALS
RESPIRATION RATE: 16 BRPM | HEART RATE: 90 BPM | SYSTOLIC BLOOD PRESSURE: 130 MMHG | OXYGEN SATURATION: 97 % | HEIGHT: 69 IN | TEMPERATURE: 98.4 F | DIASTOLIC BLOOD PRESSURE: 89 MMHG | WEIGHT: 165.6 LBS | BODY MASS INDEX: 24.53 KG/M2

## 2021-10-20 DIAGNOSIS — M62.838 TRAPEZIUS MUSCLE SPASM: ICD-10-CM

## 2021-10-20 DIAGNOSIS — M75.41 IMPINGEMENT SYNDROME OF RIGHT SHOULDER: Primary | ICD-10-CM

## 2021-10-20 PROCEDURE — 20611 DRAIN/INJ JOINT/BURSA W/US: CPT | Performed by: FAMILY MEDICINE

## 2021-10-20 PROCEDURE — 99214 OFFICE O/P EST MOD 30 MIN: CPT | Performed by: FAMILY MEDICINE

## 2021-10-20 RX ORDER — TRIAMCINOLONE ACETONIDE 40 MG/ML
40 INJECTION, SUSPENSION INTRA-ARTICULAR; INTRAMUSCULAR ONCE
Status: COMPLETED | OUTPATIENT
Start: 2021-10-20 | End: 2021-10-20

## 2021-10-20 RX ADMIN — TRIAMCINOLONE ACETONIDE 40 MG: 40 INJECTION, SUSPENSION INTRA-ARTICULAR; INTRAMUSCULAR at 17:52

## 2021-10-20 NOTE — PATIENT INSTRUCTIONS
If you have had an injection today, continue to rest the area for a few more days before resuming regular activities. It may be more painful for the first 1-2 days. NSAIDS are to be avoided. Watch for fever, or increased swelling or persistent pain in the joint. Call or return to clinic prn if such symptoms occur or there is failure to improve as anticipated. 1) Remember to stay active and/or exercise regularly (I suggest 30-45 minutes daily)   2) For reliable dietary information, go to www. EATRIGHT.org. You may wish to consider seeing the nutritionists:  Clifton Love @ [facility] or [facility] - [phone] / [phone]  Or:  Yash Truong @ [facility] or [place] - [phone],    - also consider the Mediterranean diet and DASH diets  3) I routinely suggest a complete physical exam once each year (your birth month)           Learning About Joint Injections  What are joint injections? Joint injections are shots into a joint, such as the knee or shoulder. They are used to put in medicines, such as pain relievers and steroid medicines. Steroids can be injected directly into a swollen and painful joint to reduce inflammation. A steroid shot can sometimes help with short-term pain relief when other treatments haven't worked. If steroid shots help, pain may improve for weeks or months. How are they done? First, the area over the joint will be cleaned. Your doctor may then use a tiny needle to numb the skin in the area where you will get the joint injection. If a tiny needle is used to numb the area, your doctor will use another needle to inject the medicine. Your doctor may use a pain reliever, a steroid, or both. You may feel some pressure or discomfort. The procedure takes 10 to 30 minutes. But the injection itself usually takes only a few minutes. Your doctor may put ice on the area before you go home. You will probably go home soon after your shot.   What can you expect after a joint injection? You may have numbness around the joint for a few hours. If your shot included both a pain reliever and a steroid, then the pain will probably go away right away. But it might come back after a few hours. This might happen if the pain reliever wears off and the steroid hasn't started to work yet. Steroids don't always work. But when they do, the pain relief can last for several days to a few months or longer. Your doctor may tell you to use ice on the area. You can also use ice if the pain comes back. Put ice or a cold pack on your joint for 10 to 20 minutes at a time. Put a thin cloth between the ice and your skin. Follow your doctor's instructions carefully. Follow-up care is a key part of your treatment and safety. Be sure to make and go to all appointments, and call your doctor if you are having problems. It's also a good idea to know your test results and keep a list of the medicines you take. Where can you learn more? Go to http://www.gray.com/. Enter J406 in the search box to learn more about \"Learning About Joint Injections. \"  Current as of: September 20, 2018  Content Version: 11.9  © 7982-0137 [employer], [employer]. Care instructions adapted under license by [employer] (which disclaims liability or warranty for this information). If you have questions about a medical condition or this instruction, always ask your healthcare professional. [place] [address] any warranty or liability for your use of this information.

## 2021-10-20 NOTE — PROGRESS NOTES
Chief Complaint   Patient presents with    Shoulder Pain     Patient is here for right shoulder pain     she is a 61y.o. year old female who presents for evaluation of right shoulder  Pain Assessment Encounter      Dolores Peralta  10/20/2021  Onset of Symptoms: Patient states she has had pain for 5 weeks  ________________________________________________________________________  Description:Patient states no injuries      Frequency: 5 times a day  Pain Scale:(1-10): 6  Trauma Hx: none   Hx of similar symptoms: No:   Radiation: NO,   Duration:  continuous      Progression: has worsened  What makes it better?: OTC meds  What makes it worse?:exercise and strecthing  Medications tried: acetaminophen, ibuprofen    Reviewed and agree with Nurse Note and duplicated in this note. Reviewed PmHx, RxHx, FmHx, SocHx, AllgHx and updated and dated in the chart. Family History   Problem Relation Age of Onset   Ardyth Moritz Stroke Mother     Heart Attack Mother     Coronary Artery Disease Mother     Heart Attack Father     Thyroid Disease Father     Coronary Artery Disease Father     Heart Attack Sister     High Cholesterol Sister     Coronary Artery Disease Sister     Heart Attack Brother     Coronary Artery Disease Brother        Past Medical History:   Diagnosis Date    Breast cancer, stage 1 ([facility] [address] 2007    Right    Calculus of kidney     Cancer ([facility] [address]     Cigarette smoker       Social History     Socioeconomic History    Marital status: SINGLE     Spouse name: Not on file    Number of children: Not on file    Years of education: Not on file    Highest education level: Not on file   Tobacco Use    Smoking status: Current Every Day Smoker    Smokeless tobacco: Never Used   Substance and Sexual Activity    Alcohol use:  Yes    Drug use: No    Sexual activity: Yes     Partners: Female     Social Determinants of Health     Financial Resource Strain:     Difficulty of Paying Living Expenses:    Food Insecurity:     Worried About Running Out of Food in the Last Year:     [address] in the Last Year:    Transportation Needs:     Lack of Transportation (Medical):  Lack of Transportation (Non-Medical):    Physical Activity:     Days of Exercise per Week:     Minutes of Exercise per Session:    Stress:     Feeling of Stress :    Social Connections:     Frequency of Communication with Friends and Family:     Frequency of Social Gatherings with Friends and Family:     Attends [employer] Services:     Active Member of Clubs or Organizations:     Attends Club or Organization Meetings:     Marital Status:         Review of Systems - negative except as listed above      Objective:     Vitals:    10/20/21 1315   BP: 130/89   Pulse: 90   Resp: 16   Temp: 98.4 °F (36.9 °C)   SpO2: 97%   Weight: 165 lb 9.6 oz (75.1 kg)   Height: 5' 9\" (1.753 m)       Physical Examination: General appearance - alert, well appearing, and in no distress  Back exam - full range of motion, no tenderness, palpable spasm or pain on motion  Neurological - alert, oriented, normal speech, no focal findings or movement disorder noted  Musculoskeletal -necknegative Spurling bilaterally, pain with palpation right upper trap,, no C-spine tenderness  The right shoulder is  normal to inspection. The patient has diminished range with pain  . The shoulderis tender to palpation . Bicep tendon: tender  The NEER test is positive. The Fernandez test:is positive   The Cross over test:is  negative. The Empty Can test:is  positive. Stressed ext rotation is:  negative. Stressed int rotation: positive. The Apprehension Sign is negative. The Lift off test is:  negative   Examination reveals the Painful Arch:  negative. The Labral Test is:  negative. The Sulcus Sign is:  negative.     Extremities - peripheral pulses normal, no pedal edema, no clubbing or cyanosis  Skin - normal coloration and turgor, no rashes, no suspicious skin lesions noted   Time Out taken at:  2:01 PM  10/20/2021    * Patient was identified by name and date of birth   * Agreement on procedure being performed was verified  * Risks and Benefits explained to the patient  * Procedure site verified and marked as necessary  * Patient was positioned for comfort  * Consent was signed and verified   In the presence of: Witness: Susannah  Injection #: 1  Needle:  22 gauge  Procedure: This procedure was discussed with Kristine Perez and other therapeutic options were considered (risks vs benefits). Kristine Perez and I thought that an injection was merited. After informed consent was obtained, landmarks were identified(marked), and the right bursa  was cleansed with ChlorPrep in the standard sterile manner. 3mL  1% lidocaine  and  1mL Kenalog  was then injected and needle tenotomy was not performed. Procedure performed with ultrasound needle guidance. The needle was then withdrawn. T he procedure was well tolerated. The patient is asked to continue to rest the area for a few more days before resuming regular activities. It may be more painful for the first 1-2 days. NSAIDS are to be avoided. Watch for fever, or increased swelling or persistent pain in the joint. Call or return to clinic prn if such symptoms occur or there is failure to improve as anticipated. The procedure did provide relief of symptoms in the clinic. RTC in 4 weeks for reevaluation and possible reinjection. Given the patient's body habitus and the anatomically deep nature of this structure, sonographic guidance is recommended to prevent injury to neurovascular structures and confirm accuracy of injection. Furthermore, this patient has failed conservative treatment with physical therapy and modalities and the diagnostic and therapeutic accuracy is important. Assessment/ Plan:   Diagnoses and all orders for this visit:    1.  Impingement syndrome of right shoulder  -     XR SPINE CERV 4 OR 5 V; Future  -     XR SHOULDER RT AP/LAT MIN 2 V; Future  -     triamcinolone acetonide (KENALOG-40) 40 mg/mL injection 40 mg  -     CO ARTHROCENTESIS ASPIR&/INJ MAJOR JT/BURSA W/US  -     REFERRAL TO PHYSICAL THERAPY    2. Trapezius muscle spasm  -     XR SPINE CERV 4 OR 5 V; Future         Pathophysiology, recovery and rehabilitation process discussed and questions answered   Counseling for 30 Minutes of the total visit duration   Pictures and figures used as necessary   Provided reassurance   Recommend activity modification   Recommend  lower impact activities-walking, Eliptical, Nordic Track, cycling or swimming   Follow up in 4 week(s)  Xray's reviewed - within normal limits             1) Remember to stay active and/or exercise regularly (I suggest 30-45 minutes daily)   2) For reliable dietary information, go to www. EATRIGHT.org. You may wish to consider seeing the nutritionist at [facility] [phone], also consider the [address] I have discussed the diagnosis with the patient and the intended plan as seen in the above orders. The patient has received an after-visit summary and questions were answered concerning future plans. Medication Side Effects and Warnings were discussed with patient,  Patient Labs were reviewed and or requested, and  Patient Past Records were reviewed and or requested  yes      Pt agrees to call or return to clinic and/or go to closest ER with any worsening of symptoms. This may include, but not limited to increased fever (>100.4) with NSAIDS or Tylenol, increased edema, confusion, rash, worsening of presenting symptoms. Please note that this dictation was completed with [employer], the computer voice recognition software. Quite often unanticipated grammatical, syntax, homophones, and other interpretive errors are inadvertently transcribed by the computer software. Please disregard these errors.   Please excuse any errors that have escaped final proofreading. Thank you.

## 2021-10-21 ENCOUNTER — TRANSCRIBE ORDER (OUTPATIENT)
Dept: SCHEDULING | Age: 61
End: 2021-10-21

## 2021-10-21 DIAGNOSIS — Z12.31 VISIT FOR SCREENING MAMMOGRAM: Primary | ICD-10-CM

## 2021-11-10 ENCOUNTER — HOSPITAL ENCOUNTER (OUTPATIENT)
Dept: MAMMOGRAPHY | Age: 61
Discharge: HOME | End: 2021-11-10
Attending: SURGERY
Payer: COMMERCIAL

## 2021-11-10 DIAGNOSIS — Z12.31 VISIT FOR SCREENING MAMMOGRAM: ICD-10-CM

## 2021-11-10 PROCEDURE — 77067 SCR MAMMO BI INCL CAD: CPT

## 2021-11-23 ENCOUNTER — OFFICE VISIT (OUTPATIENT)
Dept: INTERNAL MEDICINE CLINIC | Age: 61
End: 2021-11-23
Payer: COMMERCIAL

## 2021-11-23 VITALS
OXYGEN SATURATION: 95 % | BODY MASS INDEX: 24.73 KG/M2 | HEART RATE: 88 BPM | WEIGHT: 167 LBS | DIASTOLIC BLOOD PRESSURE: 61 MMHG | RESPIRATION RATE: 16 BRPM | SYSTOLIC BLOOD PRESSURE: 136 MMHG | HEIGHT: 69 IN | TEMPERATURE: 98 F

## 2021-11-23 DIAGNOSIS — M54.12 CERVICAL RADICULOPATHY: ICD-10-CM

## 2021-11-23 DIAGNOSIS — J32.0 MAXILLARY SINUSITIS, UNSPECIFIED CHRONICITY: ICD-10-CM

## 2021-11-23 DIAGNOSIS — M62.838 TRAPEZIUS MUSCLE SPASM: Primary | ICD-10-CM

## 2021-11-23 DIAGNOSIS — M75.41 IMPINGEMENT SYNDROME OF RIGHT SHOULDER: ICD-10-CM

## 2021-11-23 PROCEDURE — 20552 NJX 1/MLT TRIGGER POINT 1/2: CPT | Performed by: FAMILY MEDICINE

## 2021-11-23 PROCEDURE — 99214 OFFICE O/P EST MOD 30 MIN: CPT | Performed by: FAMILY MEDICINE

## 2021-11-23 RX ORDER — TRIAMCINOLONE ACETONIDE 40 MG/ML
40 INJECTION, SUSPENSION INTRA-ARTICULAR; INTRAMUSCULAR ONCE
Status: COMPLETED | OUTPATIENT
Start: 2021-11-23 | End: 2021-11-23

## 2021-11-23 RX ORDER — AZITHROMYCIN 250 MG/1
TABLET, FILM COATED ORAL
Qty: 6 TABLET | Refills: 0 | Status: SHIPPED | OUTPATIENT
Start: 2021-11-23 | End: 2021-11-28

## 2021-11-23 RX ADMIN — TRIAMCINOLONE ACETONIDE 40 MG: 40 INJECTION, SUSPENSION INTRA-ARTICULAR; INTRAMUSCULAR at 16:00

## 2021-11-23 NOTE — PATIENT INSTRUCTIONS
If you have had an injection today, continue to rest the area for a few more days before resuming regular activities. It may be more painful for the first 1-2 days. NSAIDS are to be avoided. Watch for fever, or increased swelling or persistent pain in the joint. Call or return to clinic prn if such symptoms occur or there is failure to improve as anticipated. 1) Remember to stay active and/or exercise regularly (I suggest 30-45 minutes daily)   2) For reliable dietary information, go to www. EATRIGHT.org. You may wish to consider seeing the nutritionists:  Tanika Adames @ [facility] or [facility] - [phone] / [phone]  Or:  Bebo Amaya @ [facility] or [address] [place] [address] - [phone],    - also consider the Mediterranean diet and DASH diets  3) I routinely suggest a complete physical exam once each year (your birth month)           Learning About Joint Injections  What are joint injections? Joint injections are shots into a joint, such as the knee or shoulder. They are used to put in medicines, such as pain relievers and steroid medicines. Steroids can be injected directly into a swollen and painful joint to reduce inflammation. A steroid shot can sometimes help with short-term pain relief when other treatments haven't worked. If steroid shots help, pain may improve for weeks or months. How are they done? First, the area over the joint will be cleaned. Your doctor may then use a tiny needle to numb the skin in the area where you will get the joint injection. If a tiny needle is used to numb the area, your doctor will use another needle to inject the medicine. Your doctor may use a pain reliever, a steroid, or both. You may feel some pressure or discomfort. The procedure takes 10 to 30 minutes. But the injection itself usually takes only a few minutes. Your doctor may put ice on the area before you go home. You will probably go home soon after your shot.   What can you expect after a joint injection? You may have numbness around the joint for a few hours. If your shot included both a pain reliever and a steroid, then the pain will probably go away right away. But it might come back after a few hours. This might happen if the pain reliever wears off and the steroid hasn't started to work yet. Steroids don't always work. But when they do, the pain relief can last for several days to a few months or longer. Your doctor may tell you to use ice on the area. You can also use ice if the pain comes back. Put ice or a cold pack on your joint for 10 to 20 minutes at a time. Put a thin cloth between the ice and your skin. Follow your doctor's instructions carefully. Follow-up care is a key part of your treatment and safety. Be sure to make and go to all appointments, and call your doctor if you are having problems. It's also a good idea to know your test results and keep a list of the medicines you take. Where can you learn more? Go to http://www.gray.com/. Enter T780 in the search box to learn more about \"Learning About Joint Injections. \"  Current as of: September 20, 2018  Content Version: 11.9  © 1427-4300 [employer], [employer]. Care instructions adapted under license by [employer] (which disclaims liability or warranty for this information). If you have questions about a medical condition or this instruction, always ask your healthcare professional. [place] [address] any warranty or liability for your use of this information.

## 2021-11-23 NOTE — PROGRESS NOTES
Chief Complaint   Patient presents with    Shoulder Pain     Patient is here for right shoulder pain follow up     she is a 64y.o. year old female who presents for follow up of injury. Follow Up Pain Assessment Encounter      Onset of Symptoms: Patient states she has had pain for months   ________________________________________________________________________  Description: Pain is now  has slightly improved      Pain Scale:(1-10): 3  Duration:  intermittent  Radiation: shoulder   What makes it better?: heat  What makes it worse?:exercise and strecthing  Medications tried: ibuprofen  Modalities tried: none    Patient with complains of 3-day history of nasal congestion, denies any cough loss of taste or smell or fever. Patient states that she usually gets this this time a year and can develop bronchitis from this. She denies any sick contacts and states that she has had all vaccinations with Covid. She denies any Covid exposure    Reviewed and agree with Nurse Note and duplicated in this note. Reviewed PmHx, RxHx, FmHx, SocHx, AllgHx and updated and dated in the chart. Family History   Problem Relation Age of Onset   Rowdy Rudder Stroke Mother     Heart Attack Mother     Coronary Art Dis Mother     Heart Attack Father     Thyroid Disease Father     Coronary Art Dis Father     Heart Attack Sister     High Cholesterol Sister     Coronary Art Dis Sister     Heart Attack Brother     Coronary Art Dis Brother        Past Medical History:   Diagnosis Date    Breast cancer, stage 1 [facility] [address] 2007    Right    Calculus of kidney     Cancer [facility] [address]     Cigarette smoker       Social History     Socioeconomic History    Marital status: SINGLE   Tobacco Use    Smoking status: Current Every Day Smoker    Smokeless tobacco: Never Used   Substance and Sexual Activity    Alcohol use:  Yes    Drug use: No    Sexual activity: Yes     Partners: Female        Review of Systems - negative except as listed above      Objective: Vitals:    11/23/21 1518   BP: 136/61   Pulse: 88   Resp: 16   Temp: 98 °F (36.7 °C)   SpO2: 95%   Weight: 167 lb (75.8 kg)   Height: 5' 9\" (1.753 m)       Physical Examination: General appearance - alert, well appearing, and in no distress  Eyes - pupils equal and reactive, extraocular eye movements intact  Ears - bilateral TM's and external ear canals normal  Nose - normal and patent, no erythema, discharge or polyps  Mouth - mucous membranes moist, pharynx normal without lesions  Neck - supple, no significant adenopathy  Chest - clear to auscultation, no wheezes, rales or rhonchi, symmetric air entry  Heart - normal rate, regular rhythm, normal S1, S2, no murmurs, rubs, clicks or gallops  Abdomen - soft, nontender, nondistended, no masses or organomegaly  Neurological - alert, oriented, normal speech, no focal findings or movement disorder noted  Musculoskeletal - neck-negative Spurling bilaterally, pain with palpation right upper trap,, no C-spine tenderness  The right shoulder is  normal to inspection. The patient has diminished range with pain  . The shoulderis tender to palpation . Bicep tendon: tender  The NEER test is positive. The Fernandez test:is positive   The Cross over test:is  negative. The Empty Can test:is  positive. Stressed ext rotation is:  negative. Stressed int rotation: positive. The Apprehension Sign is negative. The Lift off test is:  negative   Examination reveals the Painful Arch:  negative. The Labral Test is:  negative. The Sulcus Sign is:  negative.     Extremities - peripheral pulses normal, no pedal edema, no clubbing or cyanosis  Skin - normal coloration and turgor, no rashes, no suspicious skin lesions noted   Time Out taken at:  3:37 PM  11/23/2021    * Patient was identified by name and date of birth   * Agreement on procedure being performed was verified  * Risks and Benefits explained to the patient  * Procedure site verified and marked as necessary  * Patient was positioned for comfort  * Consent was signed and verified   In the presence of: Witness: Susannah  Injection #: 1  Needle:  27  Procedure: This procedure was discussed with [facility] and other therapeutic options were considered (risks vs benefits). [facility] and I thought that an injection was merited. After informed consent was obtained, landmarks were identified(marked), and the right trigger point  was cleansed with ChlorPrep in the standard sterile manner. 2mL  1% lidocaine  and  1mL Kenalog  was then injected and needle tenotomy was not performed. Procedure performed with ultrasound needle guidance. The needle was then withdrawn. T he procedure was well tolerated. The patient is asked to continue to rest the area for a few more days before resuming regular activities. It may be more painful for the first 1-2 days. NSAIDS are to be avoided. Watch for fever, or increased swelling or persistent pain in the joint. Call or return to clinic prn if such symptoms occur or there is failure to improve as anticipated. The procedure did provide relief of symptoms in the clinic. RTC in 3 weeks for reevaluation and possible reinjection. Given the patient's body habitus and the anatomically deep nature of this structure, sonographic guidance is recommended to prevent injury to neurovascular structures and confirm accuracy of injection. Furthermore, this patient has failed conservative treatment with physical therapy and modalities and the diagnostic and therapeutic accuracy is important. Assessment/ Plan:   Diagnoses and all orders for this visit:    1. Trapezius muscle spasm  -     triamcinolone acetonide (KENALOG-40) 40 mg/mL injection 40 mg  -     REFERRAL TO PHYSICAL THERAPY  -     W[place] INJECT TRIGGER POINT, 1 OR 2    2.  Impingement syndrome of right shoulder  -     REFERRAL TO PHYSICAL THERAPY    3. Cervical radiculopathy  -     REFERRAL TO PHYSICAL THERAPY    4. Maxillary sinusitis, unspecified chronicity    Other orders  -     azithromycin (ZITHROMAX) 250 mg tablet; Take 2 tablets today, then take 1 tablet daily           Pathophysiology, recovery and rehabilitation process discussed and questions answered   Counseling for 30 Minutes of the total visit duration   Pictures and figures used as necessary   Provided reassurance   Monitor response to injection   Monitor response to Physical Therapy   Recommend activity modification   Recommend  lower impact activities-walking, Eliptical, Nordic Track, cycling or swimming               I have discussed the diagnosis with the patient and the intended plan as seen in the above orders. The patient has received an after-visit summary and questions were answered concerning future plans. Medication Side Effects and Warnings were discussed with patient,  Patient Labs were reviewed and or requested, and  Patient Past Records were reviewed and or requested  yes     Pt agrees to call or return to clinic and/or go to closest ER with any worsening of symptoms. This may include, but not limited to increased fever (>100.4) with NSAIDS or Tylenol, increased edema, confusion, rash, worsening of presenting symptoms. Please note that this dictation was completed with [employer], the computer voice recognition software. Quite often unanticipated grammatical, syntax, homophones, and other interpretive errors are inadvertently transcribed by the computer software. Please disregard these errors. Please excuse any errors that have escaped final proofreading. Thank you.

## 2022-03-20 PROBLEM — N20.0 NEPHROLITHIASIS: Status: ACTIVE | Noted: 2021-08-31

## 2022-08-02 ENCOUNTER — OFFICE VISIT (OUTPATIENT)
Dept: SURGERY | Age: 62
End: 2022-08-02
Payer: COMMERCIAL

## 2022-08-02 ENCOUNTER — TELEPHONE (OUTPATIENT)
Dept: SURGERY | Age: 62
End: 2022-08-02

## 2022-08-02 VITALS
HEIGHT: 69 IN | SYSTOLIC BLOOD PRESSURE: 100 MMHG | BODY MASS INDEX: 23.99 KG/M2 | DIASTOLIC BLOOD PRESSURE: 68 MMHG | HEART RATE: 77 BPM | WEIGHT: 162 LBS

## 2022-08-02 DIAGNOSIS — S21.001A BREAST WOUND, RIGHT, INITIAL ENCOUNTER: ICD-10-CM

## 2022-08-02 DIAGNOSIS — Z85.3 HX OF BREAST CANCER: Primary | ICD-10-CM

## 2022-08-02 PROCEDURE — 99203 OFFICE O/P NEW LOW 30 MIN: CPT | Performed by: SURGERY

## 2022-08-02 NOTE — LETTER
8/2/2022    Patient: Freddie Titus   YOB: 1960   Date of Visit: 8/2/2022     Gogo Helms, [address]   [address] [place] [address]  Via Fax: [phone]    Dear Gogo Helms MD,      Thank you for referring Ms. Jaimie Reardon to [address] for evaluation. My notes for this consultation are attached. If you have questions, please do not hesitate to call me. I look forward to following your patient along with you.       Sincerely,    Vijaya Lopez MD

## 2022-08-02 NOTE — PROGRESS NOTES
HISTORY OF PRESENT ILLNESS  Freddie Titus is a 64 y.o. female. HPI . NEW patient consult self referred for RIGHT breast infection. RIGHT breast draining with pus on lower right side of RIGHT breast since  , in same area as surgery in . History:  2007 - RIGHT breast lumpectomy/mammosite. No family hx of breast or ovarian cancer     [facility] Results (most recent):  Results from Hospital Encounter encounter on 11/10/21    [facility] MAMMO BI SCREENING INCL CAD    Narrative  STUDY: Bilateral digital screening mammogram    INDICATION:  Screening. COMPARISON: Prior studies dating back to     BREAST COMPOSITION:  The breasts are heterogeneously dense, which may obscure  small masses. FINDINGS: Bilateral digital screening mammography was performed and is  interpreted in conjunction with a computer assisted detection (CAD) system. There are chronic lumpectomy changes in the posterior third of the lateral right  breast. No suspicious masses or calcifications are identified. There has been no  significant change. Impression  BI-RADS 2: Benign. No mammographic evidence of malignancy. RECOMMENDATIONS:  Next screening mammogram is recommended in one year. The patient will be notified of these results.     Past Medical History:   Diagnosis Date    Breast cancer, stage 1 ([address]     Right    Calculus of kidney     Cancer ([address]     Cigarette smoker      Past Surgical History:   Procedure Laterality Date    ENDOSCOPY, COLON, DIAGNOSTIC      HX BREAST LUMPECTOMY Right     radiation    HX LITHOTRIPSY        OB History               Para        Term   0            AB        Living             SAB        IAB        Ectopic        Molar        Multiple        Live Births              Obstetric Comments   Menarche 15, LMP ?, # of children 0, age of 1st delivery -, Hysterectomy/oophorectomy no/no, Breast bx yes, history of breast feeding no, BCP no, Hormone therapy no             Family History   Problem Relation Age of Onset    Stroke Mother     Heart Attack Mother     Coronary Art Dis Mother     Heart Attack Father     Thyroid Disease Father     Coronary Art Dis Father     Heart Attack Sister     High Cholesterol Sister     Coronary Art Dis Sister     Heart Attack Brother     Coronary Art Dis Brother      Social History     Tobacco Use    Smoking status: Every Day    Smokeless tobacco: Never   Substance Use Topics    Alcohol use: Yes      Prior to Admission medications    Not on File      Allergies   Allergen Reactions    Codeine Nausea and Vomiting    Pcn [Penicillins] Nausea and Vomiting              ROS    Physical Exam  Constitutional:       Appearance: She is well-developed. Cardiovascular:      Rate and Rhythm: Normal rate and regular rhythm. Heart sounds: Normal heart sounds. Pulmonary:      Effort: Pulmonary effort is normal.      Breath sounds: Normal breath sounds. Chest:   Breasts:     Breasts are symmetrical.      Right: Mass (hard mass at lumpectomty site) and skin change (excoriated skin at lumpectomy scar) present. No swelling, nipple discharge, tenderness, axillary adenopathy or supraclavicular adenopathy. Left: No swelling, mass, nipple discharge, skin change, tenderness, axillary adenopathy or supraclavicular adenopathy. Lymphadenopathy:      Upper Body:      Right upper body: No supraclavicular or axillary adenopathy. Left upper body: No supraclavicular or axillary adenopathy. Skin:     General: Skin is warm and dry. Neurological:      Mental Status: She is alert and oriented to person, place, and time. ASSESSMENT and PLAN    ICD-10-CM ICD-9-CM    1. Hx of breast cancer  Z85.3 V10.3       2. Breast wound, right, initial encounter  S21.001A 879.0       Total time spent on chart review and patient visit: 30 minutes    RIGHT breast draining wound at lumpectomy/mammosite scar from 2007  Discussed conservative tx or surgical excision.   Pt agrees to surgical excision as she has significant tissue necrosis at her lumpectomy site. Discussed surgery and post operative expectations:  a. Outpatient surgery with sedation. b. Surgery will take about 45 minutes, then an hour in the recovery room. c. The wound is closed with dissolving sutures and skin glue. It is okay to shower after surgery. d. Swelling and bruising are normal and can last up to 2 weeks. e.  May resume normal activities the day after surgery, but may have 1-2 weeks of pain and/or fatigue  f. Pain is usually well-controlled with ibuprofen or acetaminophen. Hydrocodone may be prescribed if needed. g. Infection and bleeding are unlikely but possible complications.

## 2022-08-02 NOTE — H&P (VIEW-ONLY)
HISTORY OF PRESENT ILLNESS  Avril Place is a 64 y.o. female. HPI . NEW patient consult self referred for RIGHT breast infection. RIGHT breast draining with pus on lower right side of RIGHT breast since  , in same area as surgery in . History:  2007 - RIGHT breast lumpectomy/mammosite. No family hx of breast or ovarian cancer     [facility] Results (most recent):  Results from Hospital Encounter encounter on 11/10/21    [facility] MAMMO BI SCREENING INCL CAD    Narrative  STUDY: Bilateral digital screening mammogram    INDICATION:  Screening. COMPARISON: Prior studies dating back to     BREAST COMPOSITION:  The breasts are heterogeneously dense, which may obscure  small masses. FINDINGS: Bilateral digital screening mammography was performed and is  interpreted in conjunction with a computer assisted detection (CAD) system. There are chronic lumpectomy changes in the posterior third of the lateral right  breast. No suspicious masses or calcifications are identified. There has been no  significant change. Impression  BI-RADS 2: Benign. No mammographic evidence of malignancy. RECOMMENDATIONS:  Next screening mammogram is recommended in one year. The patient will be notified of these results.     Past Medical History:   Diagnosis Date    Breast cancer, stage 1 ([address]     Right    Calculus of kidney     Cancer ([address]     Cigarette smoker      Past Surgical History:   Procedure Laterality Date    ENDOSCOPY, COLON, DIAGNOSTIC      HX BREAST LUMPECTOMY Right     radiation    HX LITHOTRIPSY        OB History               Para        Term   0            AB        Living             SAB        IAB        Ectopic        Molar        Multiple        Live Births              Obstetric Comments   Menarche 15, LMP ?, # of children 0, age of 1st delivery -, Hysterectomy/oophorectomy no/no, Breast bx yes, history of breast feeding no, BCP no, Hormone therapy no             Family History   Problem Relation Age of Onset    Stroke Mother     Heart Attack Mother     Coronary Art Dis Mother     Heart Attack Father     Thyroid Disease Father     Coronary Art Dis Father     Heart Attack Sister     High Cholesterol Sister     Coronary Art Dis Sister     Heart Attack Brother     Coronary Art Dis Brother      Social History     Tobacco Use    Smoking status: Every Day    Smokeless tobacco: Never   Substance Use Topics    Alcohol use: Yes      Prior to Admission medications    Not on File      Allergies   Allergen Reactions    Codeine Nausea and Vomiting    Pcn [Penicillins] Nausea and Vomiting              ROS    Physical Exam  Constitutional:       Appearance: She is well-developed. Cardiovascular:      Rate and Rhythm: Normal rate and regular rhythm. Heart sounds: Normal heart sounds. Pulmonary:      Effort: Pulmonary effort is normal.      Breath sounds: Normal breath sounds. Chest:   Breasts:     Breasts are symmetrical.      Right: Mass (hard mass at lumpectomty site) and skin change (excoriated skin at lumpectomy scar) present. No swelling, nipple discharge, tenderness, axillary adenopathy or supraclavicular adenopathy. Left: No swelling, mass, nipple discharge, skin change, tenderness, axillary adenopathy or supraclavicular adenopathy. Lymphadenopathy:      Upper Body:      Right upper body: No supraclavicular or axillary adenopathy. Left upper body: No supraclavicular or axillary adenopathy. Skin:     General: Skin is warm and dry. Neurological:      Mental Status: She is alert and oriented to person, place, and time. ASSESSMENT and PLAN    ICD-10-CM ICD-9-CM    1. Hx of breast cancer  Z85.3 V10.3       2. Breast wound, right, initial encounter  S21.001A 879.0       Total time spent on chart review and patient visit: 30 minutes    RIGHT breast draining wound at lumpectomy/mammosite scar from 2007  Discussed conservative tx or surgical excision.   Pt agrees to surgical excision as she has significant tissue necrosis at her lumpectomy site. Discussed surgery and post operative expectations:  a. Outpatient surgery with sedation. b. Surgery will take about 45 minutes, then an hour in the recovery room. c. The wound is closed with dissolving sutures and skin glue. It is okay to shower after surgery. d. Swelling and bruising are normal and can last up to 2 weeks. e.  May resume normal activities the day after surgery, but may have 1-2 weeks of pain and/or fatigue  f. Pain is usually well-controlled with ibuprofen or acetaminophen. Hydrocodone may be prescribed if needed. g. Infection and bleeding are unlikely but possible complications.

## 2022-08-04 ENCOUNTER — HOSPITAL ENCOUNTER (OUTPATIENT)
Age: 62
Setting detail: OUTPATIENT SURGERY
End: 2022-08-04
Attending: SURGERY | Admitting: SURGERY

## 2022-08-05 ENCOUNTER — HOSPITAL ENCOUNTER (OUTPATIENT)
Dept: PREADMISSION TESTING | Age: 62
Discharge: HOME | End: 2022-08-05

## 2022-08-05 VITALS
DIASTOLIC BLOOD PRESSURE: 66 MMHG | WEIGHT: 156.75 LBS | SYSTOLIC BLOOD PRESSURE: 117 MMHG | HEIGHT: 69 IN | HEART RATE: 63 BPM | OXYGEN SATURATION: 99 % | BODY MASS INDEX: 23.22 KG/M2 | RESPIRATION RATE: 14 BRPM | TEMPERATURE: 96.2 F

## 2022-08-05 RX ORDER — CHOLECALCIFEROL TAB 125 MCG (5000 UNIT) 125 MCG
5000 TAB ORAL DAILY
COMMUNITY

## 2022-08-05 NOTE — PERIOP NOTES
[address] [address] [facility]   MAIN OR                                  [phone]   MAIN PRE OP                          [phone]                                                                                AMBULATORY PRE OP          [phone]  PRE-ADMISSION TESTING    [phone]     Surgery Date:  August 10 (Wednesday)       Is surgery arrival time given by surgeon? NO  If NO, [facility] staff will call you between 3 and 7pm the day before your surgery with your arrival time. (If your surgery is on a Monday, we will call you the Friday before.)    Call [phone] after 7pm Monday-Friday if you did not receive this call. INSTRUCTIONS BEFORE YOUR SURGERY   When You  Arrive Arrive at the 2nd [address] [facility] on the day of your surgery  Have your insurance card, photo ID, and any copayment (if needed)   Food   and   Drink NO food or drink after midnight the night before surgery    This means NO water, gum, mints, coffee, juice, etc.  No alcohol (beer, wine, liquor) 24 hours before and after surgery   Medications to   TAKE   Morning of Surgery MEDICATIONS TO TAKE THE MORNING OF SURGERY WITH A SIP OF WATER:   None   Medications  To  STOP      7 days before surgery Non-Steroidal anti-inflammatory Drugs (NSAID's): for example, Ibuprofen (Advil, Motrin), Naproxen (Aleve)  Aspirin, if taking for pain   Herbal supplements, vitamins, and fish oil  Other:  (Pain medications not listed above, including Tylenol may be taken)   Blood  Thinners If you take  Aspirin, Plavix, Coumadin, or any blood-thinning or anti-blood clot medicine, talk to the doctor who prescribed the medications for pre-operative instructions.    Bathing Clothing  Jewelry  Valuables     If you shower the morning of surgery, please do not apply anything to your skin (lotions, powders, deodorant, or makeup, especially mascara)  Follow Chlorhexidine Care [employer] body wash instructions provided to you during PAT appointment. Begin 3 days prior to surgery. Do not shave or trim anywhere 24 hours before surgery  Wear your hair loose or down; no pony-tails, buns, or metal hair clips  Wear loose, comfortable, clean clothes  Wear glasses instead of contacts  Leave money, valuables, and jewelry, including body piercings, at home       Going Home - or Spending the Night SAME-DAY SURGERY: You must have a responsible adult drive you home and stay with you 24 hours after surgery     Special Instructions        Follow all instructions so your surgery wont be cancelled. Please, be on time. If a situation occurs and you are delayed the day of surgery, call  [phone]. If your physical condition changes (like a fever, cold, flu, etc.) call your surgeon. Home medication(s) reviewed and verified via  LIST   VERBAL   during PAT appointment. The patient was contacted by   IN-PERSON  The patient verbalizes understanding of all instructions and    DOES NOT   need reinforcement.

## 2022-08-09 ENCOUNTER — DOCUMENTATION ONLY (OUTPATIENT)
Dept: SURGERY | Age: 62
End: 2022-08-09

## 2022-08-09 RX ORDER — ALBUTEROL SULFATE 0.83 MG/ML
2.5 SOLUTION RESPIRATORY (INHALATION) AS NEEDED
Status: CANCELLED | OUTPATIENT
Start: 2022-08-09

## 2022-08-09 RX ORDER — LIDOCAINE HYDROCHLORIDE 10 MG/ML
0.1 INJECTION, SOLUTION EPIDURAL; INFILTRATION; INTRACAUDAL; PERINEURAL AS NEEDED
Status: CANCELLED | OUTPATIENT
Start: 2022-08-09

## 2022-08-09 RX ORDER — SODIUM CHLORIDE, SODIUM LACTATE, POTASSIUM CHLORIDE, CALCIUM CHLORIDE 600; 310; 30; 20 MG/100ML; MG/100ML; MG/100ML; MG/100ML
150 INJECTION, SOLUTION INTRAVENOUS CONTINUOUS
Status: CANCELLED | OUTPATIENT
Start: 2022-08-09 | End: 2022-08-10

## 2022-08-09 RX ORDER — HYDROMORPHONE HYDROCHLORIDE 1 MG/ML
0.5 INJECTION, SOLUTION INTRAMUSCULAR; INTRAVENOUS; SUBCUTANEOUS
Status: CANCELLED | OUTPATIENT
Start: 2022-08-09 | End: 2022-08-09

## 2022-08-09 RX ORDER — ONDANSETRON 2 MG/ML
4 INJECTION INTRAMUSCULAR; INTRAVENOUS AS NEEDED
Status: CANCELLED | OUTPATIENT
Start: 2022-08-09

## 2022-08-09 RX ORDER — DIPHENHYDRAMINE HYDROCHLORIDE 50 MG/ML
12.5 INJECTION, SOLUTION INTRAMUSCULAR; INTRAVENOUS AS NEEDED
Status: CANCELLED | OUTPATIENT
Start: 2022-08-09 | End: 2022-08-09

## 2022-08-09 RX ORDER — SODIUM CHLORIDE, SODIUM LACTATE, POTASSIUM CHLORIDE, CALCIUM CHLORIDE 600; 310; 30; 20 MG/100ML; MG/100ML; MG/100ML; MG/100ML
125 INJECTION, SOLUTION INTRAVENOUS CONTINUOUS
Status: CANCELLED | OUTPATIENT
Start: 2022-08-09

## 2022-08-09 NOTE — PROGRESS NOTES
Verbally informed patient of surgery   [facility]     Date: 08/10/2022 @ 12:00 PM    Arrive:10:00 AM  report to 2ND  floor outpatient registration day of surgery.     PAT: NURSE  will call you   Arrive: nurse  report to 2nd floor volunteer desk, take a right off the elevator      Gave following instructions:  NPO after Midnight the night before  Shower in AM, no lotion, deodorant, powder,perfume or makeup  Will need  morning of the surgery

## 2022-08-11 ENCOUNTER — DOCUMENTATION ONLY (OUTPATIENT)
Dept: SURGERY | Age: 62
End: 2022-08-11

## 2022-08-11 NOTE — PROGRESS NOTES
I returned a phone call to this patient . I had to leave a message letting her know Dr Laura Prather said next Wednesday is fine for her surgery. I will follow up tomorrow.

## 2022-08-12 ENCOUNTER — DOCUMENTATION ONLY (OUTPATIENT)
Dept: SURGERY | Age: 62
End: 2022-08-12

## 2022-08-15 RX ORDER — CEFDINIR 300 MG/1
300 CAPSULE ORAL 2 TIMES DAILY
COMMUNITY

## 2022-08-15 NOTE — PERIOP NOTES
[address] [address] [facility]   MAIN OR                                  [phone]   MAIN PRE OP                          [phone]                                                                                AMBULATORY PRE OP          [phone]  PRE-ADMISSION TESTING    [phone]     Surgery Date:  Thursday 8/17/2022       Is surgery arrival time given by surgeon? NO  If NO, [address] staff will call you between 3 and 7pm the day before your surgery with your arrival time. (If your surgery is on a Monday, we will call you the Friday before.)    Call [phone] after 7pm Monday-Friday if you did not receive this call. INSTRUCTIONS BEFORE YOUR SURGERY   When You  Arrive Arrive at the 2nd [address] [employer] on the day of your surgery  Have your insurance card, photo ID, and any copayment (if needed)   Food   and   Drink NO food or drink after midnight the night before surgery    This means NO water, gum, mints, coffee, juice, etc.  No alcohol (beer, wine, liquor) 24 hours before and after surgery   Medications to   TAKE   Morning of Surgery MEDICATIONS TO TAKE THE MORNING OF SURGERY WITH A SIP OF WATER:   cefdinir   Medications  To  STOP      7 days before surgery Non-Steroidal anti-inflammatory Drugs (NSAID's): for example, Ibuprofen (Advil, Motrin), Naproxen (Aleve)  Aspirin, if taking for pain   Herbal supplements, vitamins, and fish oil  Other:  (Pain medications not listed above, including Tylenol may be taken)   Blood  Thinners If you take  Aspirin, Plavix, Coumadin, or any blood-thinning or anti-blood clot medicine, talk to the doctor who prescribed the medications for pre-operative instructions.    Bathing Clothing  Jewelry  Valuables     If you shower the morning of surgery, please do not apply anything to your skin (lotions, powders, deodorant, or makeup, especially mascara)  Do not shave or trim anywhere 24 hours before surgery  Wear your hair loose or down; no pony-tails, buns, or metal hair clips  Wear loose, comfortable, clean clothes  Wear glasses instead of contacts  Leave money, valuables, and jewelry, including body piercings, at home     Going Home - or Spending the Night SAME-DAY SURGERY: You must have a responsible adult drive you home and stay with you 24 hours after surgery  ADMITS: If your doctor is keeping you in the hospital after surgery, leave personal belongings/luggage in your car until you have a hospital room number. Hospital discharge time is 12 noon  Drivers must be here before 12 noon unless you are told differently   Special Instructions         Follow all instructions so your surgery wont be cancelled. Please, be on time. If a situation occurs and you are delayed the day of surgery, call [phone] or [phone]. If your physical condition changes (like a fever, cold, flu, etc.) call your surgeon. Home medication(s) reviewed and verified verbally with list during PAT appointment. The patient was contacted via phone call  The patient verbalizes understanding of all instructions and does not need reinforcement.

## 2022-08-16 ENCOUNTER — ANESTHESIA EVENT (OUTPATIENT)
Dept: SURGERY | Age: 62
End: 2022-08-16
Payer: COMMERCIAL

## 2022-08-17 ENCOUNTER — HOSPITAL ENCOUNTER (OUTPATIENT)
Age: 62
Setting detail: OUTPATIENT SURGERY
Discharge: HOME | End: 2022-08-17
Attending: SURGERY | Admitting: SURGERY
Payer: COMMERCIAL

## 2022-08-17 ENCOUNTER — ANESTHESIA (OUTPATIENT)
Dept: SURGERY | Age: 62
End: 2022-08-17
Payer: COMMERCIAL

## 2022-08-17 VITALS
HEIGHT: 69 IN | SYSTOLIC BLOOD PRESSURE: 93 MMHG | HEART RATE: 62 BPM | TEMPERATURE: 96.7 F | BODY MASS INDEX: 24.42 KG/M2 | DIASTOLIC BLOOD PRESSURE: 72 MMHG | WEIGHT: 164.9 LBS | OXYGEN SATURATION: 98 % | RESPIRATION RATE: 15 BRPM

## 2022-08-17 DIAGNOSIS — Z85.3 PERSONAL HISTORY OF BREAST CANCER: ICD-10-CM

## 2022-08-17 DIAGNOSIS — S21.001A OPEN WOUND OF RIGHT BREAST, INITIAL ENCOUNTER: ICD-10-CM

## 2022-08-17 PROCEDURE — 19120 REMOVAL OF BREAST LESION: CPT | Performed by: SURGERY

## 2022-08-17 PROCEDURE — 2709999900 HC NON-CHARGEABLE SUPPLY: Performed by: SURGERY

## 2022-08-17 PROCEDURE — 74011250636 HC RX REV CODE- 250/636: Performed by: NURSE ANESTHETIST, CERTIFIED REGISTERED

## 2022-08-17 PROCEDURE — 88311 DECALCIFY TISSUE: CPT

## 2022-08-17 PROCEDURE — 76030000000 HC AMB SURG OR TIME 0.5 TO 1: Performed by: SURGERY

## 2022-08-17 PROCEDURE — 77030002996 HC SUT SLK J&J -A: Performed by: SURGERY

## 2022-08-17 PROCEDURE — 88307 TISSUE EXAM BY PATHOLOGIST: CPT

## 2022-08-17 PROCEDURE — 76210000040 HC AMBSU PH I REC FIRST 0.5 HR: Performed by: SURGERY

## 2022-08-17 PROCEDURE — 74011250636 HC RX REV CODE- 250/636: Performed by: ANESTHESIOLOGY

## 2022-08-17 PROCEDURE — 77030040922 HC BLNKT HYPOTHRM STRY -A

## 2022-08-17 PROCEDURE — 77030040361 HC SLV COMPR DVT MDII -B

## 2022-08-17 PROCEDURE — 74011000250 HC RX REV CODE- 250: Performed by: SURGERY

## 2022-08-17 PROCEDURE — 76060000061 HC AMB SURG ANES 0.5 TO 1 HR: Performed by: SURGERY

## 2022-08-17 RX ORDER — FLUMAZENIL 0.1 MG/ML
0.2 INJECTION INTRAVENOUS
Status: DISCONTINUED | OUTPATIENT
Start: 2022-08-17 | End: 2022-08-17 | Stop reason: HOSPADM

## 2022-08-17 RX ORDER — BUPIVACAINE HYDROCHLORIDE AND EPINEPHRINE 5; 5 MG/ML; UG/ML
INJECTION, SOLUTION EPIDURAL; INTRACAUDAL; PERINEURAL AS NEEDED
Status: DISCONTINUED | OUTPATIENT
Start: 2022-08-17 | End: 2022-08-17 | Stop reason: HOSPADM

## 2022-08-17 RX ORDER — FENTANYL CITRATE 50 UG/ML
25 INJECTION, SOLUTION INTRAMUSCULAR; INTRAVENOUS
Status: DISCONTINUED | OUTPATIENT
Start: 2022-08-17 | End: 2022-08-17 | Stop reason: HOSPADM

## 2022-08-17 RX ORDER — SODIUM CHLORIDE 0.9 % (FLUSH) 0.9 %
5-40 SYRINGE (ML) INJECTION AS NEEDED
Status: DISCONTINUED | OUTPATIENT
Start: 2022-08-17 | End: 2022-08-17 | Stop reason: HOSPADM

## 2022-08-17 RX ORDER — PROPOFOL 10 MG/ML
INJECTION, EMULSION INTRAVENOUS AS NEEDED
Status: DISCONTINUED | OUTPATIENT
Start: 2022-08-17 | End: 2022-08-17 | Stop reason: HOSPADM

## 2022-08-17 RX ORDER — NALOXONE HYDROCHLORIDE 0.4 MG/ML
0.04 INJECTION, SOLUTION INTRAMUSCULAR; INTRAVENOUS; SUBCUTANEOUS
Status: DISCONTINUED | OUTPATIENT
Start: 2022-08-17 | End: 2022-08-17 | Stop reason: HOSPADM

## 2022-08-17 RX ORDER — HYDROMORPHONE HYDROCHLORIDE 1 MG/ML
.25-1 INJECTION, SOLUTION INTRAMUSCULAR; INTRAVENOUS; SUBCUTANEOUS
Status: DISCONTINUED | OUTPATIENT
Start: 2022-08-17 | End: 2022-08-17 | Stop reason: HOSPADM

## 2022-08-17 RX ORDER — SODIUM CHLORIDE 0.9 % (FLUSH) 0.9 %
5-40 SYRINGE (ML) INJECTION EVERY 8 HOURS
Status: DISCONTINUED | OUTPATIENT
Start: 2022-08-17 | End: 2022-08-17 | Stop reason: HOSPADM

## 2022-08-17 RX ORDER — FENTANYL CITRATE 50 UG/ML
INJECTION, SOLUTION INTRAMUSCULAR; INTRAVENOUS AS NEEDED
Status: DISCONTINUED | OUTPATIENT
Start: 2022-08-17 | End: 2022-08-17 | Stop reason: HOSPADM

## 2022-08-17 RX ORDER — DIPHENHYDRAMINE HYDROCHLORIDE 50 MG/ML
12.5 INJECTION, SOLUTION INTRAMUSCULAR; INTRAVENOUS AS NEEDED
Status: DISCONTINUED | OUTPATIENT
Start: 2022-08-17 | End: 2022-08-17 | Stop reason: HOSPADM

## 2022-08-17 RX ORDER — MIDAZOLAM HYDROCHLORIDE 1 MG/ML
INJECTION, SOLUTION INTRAMUSCULAR; INTRAVENOUS AS NEEDED
Status: DISCONTINUED | OUTPATIENT
Start: 2022-08-17 | End: 2022-08-17 | Stop reason: HOSPADM

## 2022-08-17 RX ORDER — ONDANSETRON 2 MG/ML
4 INJECTION INTRAMUSCULAR; INTRAVENOUS AS NEEDED
Status: DISCONTINUED | OUTPATIENT
Start: 2022-08-17 | End: 2022-08-17 | Stop reason: HOSPADM

## 2022-08-17 RX ORDER — BUPIVACAINE HYDROCHLORIDE AND EPINEPHRINE 5; 5 MG/ML; UG/ML
30 INJECTION, SOLUTION EPIDURAL; INTRACAUDAL; PERINEURAL
Status: DISCONTINUED | OUTPATIENT
Start: 2022-08-17 | End: 2022-08-17 | Stop reason: HOSPADM

## 2022-08-17 RX ORDER — SODIUM CHLORIDE, SODIUM LACTATE, POTASSIUM CHLORIDE, CALCIUM CHLORIDE 600; 310; 30; 20 MG/100ML; MG/100ML; MG/100ML; MG/100ML
125 INJECTION, SOLUTION INTRAVENOUS CONTINUOUS
Status: DISCONTINUED | OUTPATIENT
Start: 2022-08-17 | End: 2022-08-17 | Stop reason: HOSPADM

## 2022-08-17 RX ORDER — LIDOCAINE HYDROCHLORIDE 10 MG/ML
0.1 INJECTION, SOLUTION EPIDURAL; INFILTRATION; INTRACAUDAL; PERINEURAL AS NEEDED
Status: DISCONTINUED | OUTPATIENT
Start: 2022-08-17 | End: 2022-08-17 | Stop reason: HOSPADM

## 2022-08-17 RX ORDER — ALBUTEROL SULFATE 0.83 MG/ML
2.5 SOLUTION RESPIRATORY (INHALATION) AS NEEDED
Status: DISCONTINUED | OUTPATIENT
Start: 2022-08-17 | End: 2022-08-17 | Stop reason: HOSPADM

## 2022-08-17 RX ORDER — PROPOFOL 10 MG/ML
INJECTION, EMULSION INTRAVENOUS
Status: DISCONTINUED | OUTPATIENT
Start: 2022-08-17 | End: 2022-08-17 | Stop reason: HOSPADM

## 2022-08-17 RX ADMIN — PROPOFOL 100 MCG/KG/MIN: 10 INJECTION, EMULSION INTRAVENOUS at 08:30

## 2022-08-17 RX ADMIN — PROPOFOL 30 MG: 10 INJECTION, EMULSION INTRAVENOUS at 08:30

## 2022-08-17 RX ADMIN — FENTANYL CITRATE 50 MCG: 50 INJECTION, SOLUTION INTRAMUSCULAR; INTRAVENOUS at 08:43

## 2022-08-17 RX ADMIN — PROPOFOL 30 MG: 10 INJECTION, EMULSION INTRAVENOUS at 08:42

## 2022-08-17 RX ADMIN — FENTANYL CITRATE 50 MCG: 50 INJECTION, SOLUTION INTRAMUSCULAR; INTRAVENOUS at 08:22

## 2022-08-17 RX ADMIN — SODIUM CHLORIDE, POTASSIUM CHLORIDE, SODIUM LACTATE AND CALCIUM CHLORIDE 125 ML/HR: 600; 310; 30; 20 INJECTION, SOLUTION INTRAVENOUS at 06:56

## 2022-08-17 RX ADMIN — MIDAZOLAM HYDROCHLORIDE 2 MG: 2 INJECTION, SOLUTION INTRAMUSCULAR; INTRAVENOUS at 08:22

## 2022-08-17 NOTE — INTERVAL H&P NOTE
Update History & Physical    The Patient's History and Physical of August 2, 2022     was reviewed with the patient and I examined the patient. There was no change. The surgical site was confirmed by the patient and me. Plan:  The risk, benefits, expected outcome, and alternative to the recommended procedure have been discussed with the patient. Patient understands and wants to proceed with the procedure.     Electronically signed by Katlin Carrillo MD on 8/17/2022 at 7:03 AM

## 2022-08-17 NOTE — OP NOTES
[facility]  [address] [facility],[address] [address]    Name: Ameena Logan  : 1960    Breast Biopsy   Operative Report    Date of Surgery: 2022   Preoperative Diagnosis: RIGHT breast non-healing wound at lumpectomy site  Postoperative Diagnosis: same  Surgeon: Dr Gloria Cuevas  Anesthesia: MAC  Procedure:  RIGHT breast excisional biopsy   Indication:  2007 Lumpectomy and mammosite radiation. Non-healing wound at lumpectomy site  Procedure in Detail:  The patient was sedated with IV sedation. The right breast was prepped and draped in the usual sterile manner. The palpable mass was in the lower, outer quadrant. 0.5% marcaine with epinephrine was injected for post operative pain control. A 5 cm vertical elliptical incision was made around the nonhealing skin lesion. The underlying scar tissue was excised with the skin. The specimen measured 5cm by 4cm. The specimen was marked with sutures for orientation purposes and sent to Pathology. Hemostasis was controlled with electrocautery. The breast tissue was reapproximated with multiple interrupted 3-0 Vicryl sutures. The dermis was re-approximated with 3-0 vicryl suture and the skin was closed  with a running 4-0 Monocryl suture. The wound was dressed with skin glue. The patient was awakened and taken to the recovery room. Sponge, needle and instrument counts were reported to be correct.     Findings:  healthy tissue deep to the scar tissue    Estimated Blood Loss:  <10 ml           Specimens:   ID Type Source Tests Collected by Time Destination   1 : RIGHT BREAST MASS Preservative Breast  Najma Louie MD 2022 [facility] Pathology         Complications: none  Implants: none  Assistant: Garcia Saavedra SA    Signed By: Awilda Collins MD

## 2022-08-17 NOTE — DISCHARGE INSTRUCTIONS
Discharge Instructions from Dr. Yarelis Reza    Patient Discharge Instructions    [facility] / [id number] : 1960    Admitted 2022 Discharged: 2022   What to do at Home  Diet:Regular  Activity: As tolerated. No driving if taking pain medication. Okay to shower or take a bath. You may chose to wear a bra to sleep in for extra support for the next few days. Pain control: Ice pack 20 minutes of every hour until you go to bed tonight. You may use over-the-counter medication as needed (acetominophen, aspirin, ibuprofen). Tomorrow you may put a heat pack on the breast.  Dressing: The skin glue is waterproof. It is okay to wash normally at this site. If the glue is still present after 10 days you should peel it off. Follow up: If needed, call the office to make an appointment. [phone]. I will call with results within one week. [facility] [address]  Problems/Questions: Call Avleino Mendoza MD on my cell phone. [phone]    DISCHARGE SUMMARY from your Nurse      PATIENT INSTRUCTIONS    After general anesthesia or intravenous sedation, for 24 hours or while taking prescription Narcotics:  Limit your activities  Do not drive and operate hazardous machinery  Do not make important personal or business decisions  Do  not drink alcoholic beverages  If you have not urinated within 8 hours after discharge, please contact your surgeon on call.     Report the following to your surgeon:  Excessive pain, swelling, redness or odor of or around the surgical area  Temperature over 100.5  Nausea and vomiting lasting longer than 4 hours or if unable to take medications  Any signs of decreased circulation or nerve impairment to extremity: change in color, persistent  numbness, tingling, coldness or increase pain  Any questions      GOOD HELP TO FIGHT AN INFECTION  Here are a few tip to help reduce the chance of getting an infection after surgery:  Wash Your Hands  Good handwashing is the most important thing you and your caregiver can do. Wash before and after caring for any wounds. Dry your hand with a clean towel. Wash with soap and water for at least 20 seconds. A TIP: sing the \"Happy Birthday\" song through one time while washing to help with the timing. Use a hand  in between washings. Shower  When your surgeon says it is OK to take a shower, use a new bar of antibacterial soap (if that is what you use, and keep that bar of soap ONLY for your use), or antibacterial body wash. Use a clean wash cloth or sponge when you bathe. Dry off with a clean towel  after every bath - be careful around any wounds, skin staples, sutures or surgical glue over/on wounds. Do not enter swimming pools, hot tubs, lakes, rivers and/or ocean until wounds are healed and your doctor/surgeon says it is OK. Use Clean Sheets  Sleep on freshly laundered sheets after your surgery. Keep the surgery site covered with a clean, dry bandage (if instructed to do so). If the bandage becomes soiled, reapply a new, dry, clean bandage. Do not allow pets to sleep with you while your wound is healing. Lifestyle Modification and Controlling Your Blood Sugar  Smoking slows wound healing. Stop smoking and limit exposure to second-hand smoke. High blood sugar slows wound healing. Eat a well-balanced diet to provide proper nutrition while healing  Monitor your blood sugar (if you are a diabetic) and take your medications as you are suppose to so you can control you blood sugar after surgery. COUGH AND DEEP BREATHE    Breathing deeply and coughing are very important exercises to do after surgery. Deep breathing and coughing open the little air tubes and air sacks in your lungs. You take deep breaths every day. You may not even notice - it is just something you do when you sigh or yawn. It is a natural exercise you do to keep these air passages open.       After surgery, take deep breaths and cough, on purpose. DIRECTIONS:  Take 10 to 15 slow deep breaths every hour while awake. Breathe in deeply, and hold it for 2 seconds. Exhale slowly through puckered lips, like blowing up a balloon. After every 4th or 5th deep breath, hug your pillow to your chest or belly and give a hard, deep cough. Yes, it will probably hurt. But doing this exercise is a very important part of healing after surgery. Take your pain medicine to help you do this exercise without too much pain. Coughing and deep breathing help prevent bronchitis and pneumonia after surgery. If you had chest or belly surgery, use a pillow as a \"hug sowmya\" and hold it tightly to your chest or belly when you cough. ANKLE PUMPS    Ankle pumps increase the circulation of oxygenated blood to your lower extremities and decrease your risk for circulation problems such as blood clots. They also stretch the muscles, tendons and ligaments in your foot and ankle, and prevent joint contracture in the ankle and foot, especially after surgeries on the legs. It is important to do ankle pump exercises regularly after surgery because immobility increases your risk for developing a blood clot. Your doctor may also have you take an Aspirin for the next few days as well. If your doctor did not ask you to take an Aspirin, consult with him before starting Aspirin therapy on your own. The exercise is quite simple. Slowly point your foot forward, feeling the muscles on the top of your lower leg stretch, and hold this position for 5 seconds. Next, pull your foot back toward you as far as possible, stretching the calf muscles, and hold that position for 5 seconds. Repeat with the other foot. Perform 10 repetitions every hour while awake for both ankles if possible (down and then up with the foot once is one repetition).     You should feel gentle stretching of the muscles in your lower leg when doing this exercise. If you feel pain, or your range of motion is limited, don't push too hard. Only go the limit your joint and muscles will let you go. If you have increasing pain, progressively worsening leg warmth or swelling, STOP the exercise and call your doctor. MEDICATION AND   SIDE EFFECT GUIDE    The [employer] MEDICATION AND SIDE EFFECT GUIDE was provided to the PATIENT AND CARE PROVIDER. Information provided includes instruction about drug purpose and common side effects for the following medications:   none        These are general instructions for a healthy lifestyle:    *   Please give a list of your current medications to your Primary Care Provider. *   Please update this list whenever your medications are discontinued, doses are changed, or new medications (including over-the-counter products) are added. *   Please carry medication information at all times in case of emergency situations. About Smoking  No smoking / No tobacco products  Avoid exposure to second hand smoke     Surgeon General's Warning:  Quitting smoking now greatly reduces serious risk to your health. Obesity, smoking, and sedentary lifestyle greatly increases your risk for illness and disease. A healthy diet, regular physical exercise & weight monitoring are important for maintaining a healthy lifestyle. Congestive Heart Failure  You may be retaining fluid if you have a history of heart failure or if you experience any of the following symptoms:  Weight gain of 3 pounds or more overnight or 5 pounds in a week, increased swelling in your hands or feet or shortness of breath while lying flat in bed. Please call your doctor as soon as you notice any of these symptoms; do not wait until your next office visit.       Recognize signs and symptoms of STROKE:  F -  Face looks uneven  A -  Arms unable to move or move evenly  S -  Speech slurred or non-existent  T -  Time-call 911 as soon as signs and symptoms begin-DO NOT go          back to bed or wait to see if you get better-TIME IS BRAIN. Warning Signs of HEART ATTACK   Call 911 if you have these symptoms:    Chest discomfort. Most heart attacks involve discomfort in the center of the chest that lasts more than a few minutes, or that goes away and comes back. It can feel like uncomfortable pressure, squeezing, fullness, or pain. Discomfort in other areas of the upper body. Symptoms can include pain or discomfort in one or both arms, the back, neck, jaw, or stomach. Shortness of breath with or without chest discomfort. Other signs may include breaking out in a cold sweat, nausea, or lightheadedness. Don't wait more than five minutes to call 911 - MINUTES MATTER! Fast action can save your life. Calling 911 is almost always the fastest way to get lifesaving treatment. Emergency Medical Services staff can begin treatment when they arrive -- up to an hour sooner than if someone gets to the hospital by car. Learning About Coronavirus [phone])  Coronavirus [phone]): Overview  What is coronavirus (COVID-19)? The coronavirus disease (COVID-19) is caused by a virus. It is an illness that was first found in [place], [place], in December 2019. It has since spread worldwide. The virus can cause fever, cough, and trouble breathing. In severe cases, it can cause pneumonia and make it hard to breathe without help. It can cause death. Coronaviruses are a large group of viruses. They cause the common cold. They also cause more serious illnesses like Middle East respiratory syndrome (MERS) and severe acute respiratory syndrome (SARS). COVID-19 is caused by a novel coronavirus. That means it's a new type that has not been seen in people before. This virus spreads person-to-person through droplets from coughing and sneezing. It can also spread when you are close to someone who is infected.  And it can spread when you touch something that has the virus on it, such as a doorknob or a tabletop. What can you do to protect yourself from coronavirus (COVID-19)? The best way to protect yourself from getting sick is to: Avoid areas where there is an outbreak. Avoid contact with people who may be infected. Wash your hands often with soap or alcohol-based hand sanitizers. Avoid crowds and try to stay at least 6 feet away from other people. Wash your hands often, especially after you cough or sneeze. Use soap and water, and scrub for at least 20 seconds. If soap and water aren't available, use an alcohol-based hand . Avoid touching your mouth, nose, and eyes. What can you do to avoid spreading the virus to others? To help avoid spreading the virus to others:  Cover your mouth with a tissue when you cough or sneeze. Then throw the tissue in the trash. Use a disinfectant to clean things that you touch often. Stay home if you are sick or have been exposed to the virus. Don't go to school, work, or public areas. And don't use public transportation. If you are sick:  Leave your home only if you need to get medical care. But call the doctor's office first so they know you're coming. And wear a face mask, if you have one. If you have a face mask, wear it whenever you're around other people. It can help stop the spread of the virus when you cough or sneeze. Clean and disinfect your home every day. Use household  and disinfectant wipes or sprays. Take special care to clean things that you grab with your hands. These include doorknobs, remote controls, phones, and handles on your refrigerator and microwave. And don't forget countertops, tabletops, bathrooms, and computer keyboards. When to call for help  Call 911 anytime you think you may need emergency care. For example, call if:  You have severe trouble breathing. (You can't talk at all.)  You have constant chest pain or pressure. You are severely dizzy or lightheaded.   You are confused or can't think clearly. Your face and lips have a blue color. You pass out (lose consciousness) or are very hard to wake up. Call your doctor now if you develop symptoms such as:  Shortness of breath. Fever. Cough. If you need to get care, call ahead to the doctor's office for instructions before you go. Make sure you wear a face mask, if you have one, to prevent exposing other people to the virus. Where can you get the latest information? The following health organizations are tracking and studying this virus. Their websites contain the most up-to-date information. Marlee Mason also learn what to do if you think you may have been exposed to the virus. U.S. Centers for Disease Control and Prevention (CDC): The CDC provides updated news about the disease and travel advice. The website also tells you how to prevent the spread of infection. www.cdc.gov  World Health Organization [facility]): WHO offers information about the virus outbreaks. WHO also has travel advice. www.who.int  Current as of: April 1, 2020               Content Version: 12.4  © 2683-5585 [employer], Incorporated. Care instructions adapted under license by your healthcare professional. If you have questions about a medical condition or this instruction, always ask your healthcare professional. [address] any warranty or liability for your use of this information. The discharge information has been reviewed with the patient and caregiver. Any questions and concerns from the patient and caregiver have been addressed. The patient and caregiver verbalized understanding.         CONTENTS FOUND IN YOUR DISCHARGE ENVELOPE:  [x]     Surgeon and Hospital Discharge Instructions  [x]     [facility] Surgical [facility] Care Provider Card  []     Medication & Side Effect Guide            (your newly prescribed medications have been marked/highlighted showing the most common side effects from   the classes of drugs on your prescriptions)  [] Medication Prescription(s) x none ( [] These have been sent electronically to your pharmacy by your surgeon,   - OR -       your surgeon has already provided these to you during a previous/pre-op office visit)  []     [address]  []     Physical Therapy Prescription  []     Follow-up Appointment Cards  []     Surgery-related Pictures/Media  []     Pain block and/or block with On-Q Catheter from Anesthesia Service (information included in your instructions above)  []     Medical device information sheets/pamphlets from their    []     School/work excuse note. []     /parent work excuse note. The following personal items collected during your admission are returned to you:   Dental Appliance: Dental Appliances: None  Vision: Visual Aid: None  Hearing Aid:    Jewelry: Jewelry: None  Clothing: Clothing: Undergarments, Footwear, Shirt, Shorts  Other Valuables:  Other Valuables: Wallet  Valuables sent to safe:

## 2022-08-17 NOTE — ANESTHESIA POSTPROCEDURE EVALUATION
Procedure(s):  RIGHT BREAST EXCISIONAL BIOPSY. MAC    Anesthesia Post Evaluation      Multimodal analgesia: multimodal analgesia not used between 6 hours prior to anesthesia start to PACU discharge  Patient location during evaluation: PACU  Patient participation: complete - patient participated  Level of consciousness: awake and alert  Pain score: 0  Pain management: adequate  Airway patency: patent  Anesthetic complications: no  Cardiovascular status: hemodynamically stable and acceptable  Respiratory status: acceptable  Hydration status: acceptable  Comments: Patient seen and evaluated; no concerns. Post anesthesia nausea and vomiting:  none      INITIAL Post-op Vital signs:   Vitals Value Taken Time   /56 08/17/22 0935   Temp 35.9 °C (96.7 °F) 08/17/22 0915   Pulse 61 08/17/22 0935   Resp 17 08/17/22 0935   SpO2 97 % 08/17/22 0935   Vitals shown include unvalidated device data.

## 2022-08-17 NOTE — ANESTHESIA PREPROCEDURE EVALUATION
Relevant Problems   No relevant active problems       Anesthetic History               Review of Systems / Medical History  Patient summary reviewed and nursing notes reviewed    Pulmonary                Comments: Previous smoker quit 2010   Neuro/Psych   Within defined limits           Cardiovascular  Within defined limits                     GI/Hepatic/Renal  Within defined limits              Endo/Other        Cancer    Comments: Right breast cancer Other Findings              Physical Exam    Airway  Mallampati: III    Neck ROM: normal range of motion   Mouth opening: Normal     Cardiovascular    Rhythm: regular  Rate: normal         Dental         Pulmonary  Breath sounds clear to auscultation               Abdominal         Other Findings            Anesthetic Plan    ASA: 2  Anesthesia type: MAC            Anesthetic plan and risks discussed with: Patient      Informed consent obtained.

## 2022-08-22 ENCOUNTER — TELEPHONE (OUTPATIENT)
Dept: SURGERY | Age: 62
End: 2022-08-22

## 2022-08-22 NOTE — TELEPHONE ENCOUNTER
Right breast mass, lumpectomy:        Fat necrosis with mixed inflammation and calcifications.    POD#5    Spoke with patient  She is healing well  PRN follow up

## 2022-10-28 ENCOUNTER — TRANSCRIBE ORDER (OUTPATIENT)
Dept: SCHEDULING | Age: 62
End: 2022-10-28

## 2022-10-28 DIAGNOSIS — Z12.31 SCREENING MAMMOGRAM FOR HIGH-RISK PATIENT: Primary | ICD-10-CM

## 2022-11-28 ENCOUNTER — HOSPITAL ENCOUNTER (OUTPATIENT)
Dept: MAMMOGRAPHY | Age: 62
Discharge: HOME | End: 2022-11-28
Attending: SURGERY
Payer: COMMERCIAL

## 2022-11-28 DIAGNOSIS — Z12.31 SCREENING MAMMOGRAM FOR HIGH-RISK PATIENT: ICD-10-CM

## 2022-11-28 PROCEDURE — 77067 SCR MAMMO BI INCL CAD: CPT

## 2023-06-12 NOTE — PROGRESS NOTES
Verbally informed patient of surgery    [facility]    Date: 08/17/2022 @ 8:30 AM    Arrive:  6:30 AM  report to 2 [address] floor outpatient registration day of surgery. PAT: Patient had pre admission testing 2 weeks ago.    Arrive:nurse will call  : report to 2nd floor volunteer desk, take a right off the elevator      Gave following instructions:    NPO after Midnight the night before    Shower in AM, no lotion, deodorant, powder,perfume or makeup    Will need  morning of the surgery
DOUGLAS Riojas MD

## 2023-12-08 ENCOUNTER — HOSPITAL ENCOUNTER (OUTPATIENT)
Facility: HOSPITAL | Age: 63
Discharge: HOME | End: 2023-12-08
Payer: COMMERCIAL

## 2023-12-08 VITALS — HEIGHT: 70 IN | WEIGHT: 170 LBS | BODY MASS INDEX: 24.34 KG/M2

## 2023-12-08 DIAGNOSIS — Z12.31 VISIT FOR SCREENING MAMMOGRAM: ICD-10-CM

## 2023-12-08 PROCEDURE — 77067 SCR MAMMO BI INCL CAD: CPT

## 2024-02-23 SDOH — HEALTH STABILITY: PHYSICAL HEALTH: ON AVERAGE, HOW MANY DAYS PER WEEK DO YOU ENGAGE IN MODERATE TO STRENUOUS EXERCISE (LIKE A BRISK WALK)?: 5 DAYS

## 2024-02-23 SDOH — HEALTH STABILITY: PHYSICAL HEALTH: ON AVERAGE, HOW MANY MINUTES DO YOU ENGAGE IN EXERCISE AT THIS LEVEL?: 40 MIN

## 2024-02-26 ENCOUNTER — OFFICE VISIT (OUTPATIENT)
Facility: CLINIC | Age: 64
End: 2024-02-26
Payer: COMMERCIAL

## 2024-02-26 VITALS
TEMPERATURE: 98.9 F | BODY MASS INDEX: 26.79 KG/M2 | SYSTOLIC BLOOD PRESSURE: 113 MMHG | HEIGHT: 70 IN | WEIGHT: 187.1 LBS | RESPIRATION RATE: 16 BRPM | HEART RATE: 74 BPM | DIASTOLIC BLOOD PRESSURE: 70 MMHG

## 2024-02-26 DIAGNOSIS — Z00.00 WELL WOMAN EXAM (NO GYNECOLOGICAL EXAM): Primary | ICD-10-CM

## 2024-02-26 DIAGNOSIS — Z11.59 NEED FOR HEPATITIS C SCREENING TEST: ICD-10-CM

## 2024-02-26 PROCEDURE — G8484 FLU IMMUNIZE NO ADMIN: HCPCS | Performed by: FAMILY MEDICINE

## 2024-02-26 PROCEDURE — 99396 PREV VISIT EST AGE 40-64: CPT | Performed by: FAMILY MEDICINE

## 2024-02-26 SDOH — ECONOMIC STABILITY: INCOME INSECURITY: HOW HARD IS IT FOR YOU TO PAY FOR THE VERY BASICS LIKE FOOD, HOUSING, MEDICAL CARE, AND HEATING?: NOT HARD AT ALL

## 2024-02-26 SDOH — ECONOMIC STABILITY: HOUSING INSECURITY
IN THE LAST 12 MONTHS, WAS THERE A TIME WHEN YOU DID NOT HAVE A STEADY PLACE TO SLEEP OR SLEPT IN A SHELTER (INCLUDING NOW)?: NO

## 2024-02-26 SDOH — ECONOMIC STABILITY: FOOD INSECURITY: WITHIN THE PAST 12 MONTHS, THE FOOD YOU BOUGHT JUST DIDN'T LAST AND YOU DIDN'T HAVE MONEY TO GET MORE.: NEVER TRUE

## 2024-02-26 SDOH — ECONOMIC STABILITY: FOOD INSECURITY: WITHIN THE PAST 12 MONTHS, YOU WORRIED THAT YOUR FOOD WOULD RUN OUT BEFORE YOU GOT MONEY TO BUY MORE.: NEVER TRUE

## 2024-02-26 ASSESSMENT — ANXIETY QUESTIONNAIRES
5. BEING SO RESTLESS THAT IT IS HARD TO SIT STILL: 0
1. FEELING NERVOUS, ANXIOUS, OR ON EDGE: 0
GAD7 TOTAL SCORE: 0
2. NOT BEING ABLE TO STOP OR CONTROL WORRYING: 0
4. TROUBLE RELAXING: 0
6. BECOMING EASILY ANNOYED OR IRRITABLE: 0
3. WORRYING TOO MUCH ABOUT DIFFERENT THINGS: 0
IF YOU CHECKED OFF ANY PROBLEMS ON THIS QUESTIONNAIRE, HOW DIFFICULT HAVE THESE PROBLEMS MADE IT FOR YOU TO DO YOUR WORK, TAKE CARE OF THINGS AT HOME, OR GET ALONG WITH OTHER PEOPLE: NOT DIFFICULT AT ALL
7. FEELING AFRAID AS IF SOMETHING AWFUL MIGHT HAPPEN: 0

## 2024-02-26 ASSESSMENT — PATIENT HEALTH QUESTIONNAIRE - PHQ9
SUM OF ALL RESPONSES TO PHQ QUESTIONS 1-9: 0
1. LITTLE INTEREST OR PLEASURE IN DOING THINGS: 0
SUM OF ALL RESPONSES TO PHQ9 QUESTIONS 1 & 2: 0
2. FEELING DOWN, DEPRESSED OR HOPELESS: 0
SUM OF ALL RESPONSES TO PHQ QUESTIONS 1-9: 0

## 2024-02-26 NOTE — PROGRESS NOTES
Chief Complaint   Patient presents with    New Patient     Etsablishing care     she is a 63 y.o. year old female who presents for CPE  Complete Physical Exam Questions:    LMP -  21 years ago  Last Pap (q 3 years, or q5 with HPV) - yes  Last Mammogram (50-74 biennially)- yes  Hx of abnl Pap - No    1.  Do you follow a low fat diet?  no  2.  Are you up to date on your Tdap (<10 years)?  No  3.  Have you ever had a Pneumovax vaccine (>65)?  No   PCV13 No   PPSV23 No  4.  Have you had Zoster vaccine (>60)? No  5.  Have you had the HPV - Gardasil (13- 26)? No  6.  Do you follow an exercise program?  Yes  7.  Do you smoke?  No  8.  Do you consider yourself overweight?  Yes  9.  Is there a family history of CAD< age 50? Yes  10.  Is there a family history of Cancer?  Yes  11.  Do you know your Cancer risks?  No  12.  Have you had a colonoscopy?  Yes  13. Have you been tested for HIV or other STI's? No HIV testing today(18-66 y/o)?No  14.  Have had a bone density scan or DEXA done(>65)?Yes  15.  Have you had an EKG performed in the last five years (>50)?  Yes    Other complaints:    Reviewed and agree with Nurse Note and duplicated in this note.  Reviewed PmHx, RxHx, FmHx, SocHx, AllgHx and updated and dated in the chart.    Family History   Problem Relation Age of Onset    Heart Attack Mother     Stroke Mother     Coronary Art Dis Mother     Coronary Art Dis Father     Thyroid Disease Father     Heart Attack Father     Heart Attack Sister     High Cholesterol Sister     Coronary Art Dis Sister     Heart Attack Brother     Coronary Art Dis Brother        Past Medical History:   Diagnosis Date    Breast cancer (HCC)     Breast cancer, stage 1 (HCC) 2007    Right    Calculus of kidney     Cigarette smoker     quit 2010    History of therapeutic radiation       Social History     Socioeconomic History    Marital status: Single     Spouse name: None    Number of children: None    Years of education: None    Highest education

## 2024-02-27 LAB
ALBUMIN SERPL-MCNC: 3.7 G/DL (ref 3.5–5)
ALBUMIN/GLOB SERPL: 1.2 (ref 1.1–2.2)
ALP SERPL-CCNC: 95 U/L (ref 45–117)
ALT SERPL-CCNC: 46 U/L (ref 12–78)
ANION GAP SERPL CALC-SCNC: 3 MMOL/L (ref 5–15)
AST SERPL-CCNC: 30 U/L (ref 15–37)
BASOPHILS # BLD: 0.1 K/UL (ref 0–0.1)
BASOPHILS NFR BLD: 1 % (ref 0–1)
BILIRUB SERPL-MCNC: 0.2 MG/DL (ref 0.2–1)
BUN SERPL-MCNC: 23 MG/DL (ref 6–20)
BUN/CREAT SERPL: 25 (ref 12–20)
CALCIUM SERPL-MCNC: 9 MG/DL (ref 8.5–10.1)
CHLORIDE SERPL-SCNC: 110 MMOL/L (ref 97–108)
CHOLEST SERPL-MCNC: 288 MG/DL
CO2 SERPL-SCNC: 27 MMOL/L (ref 21–32)
CREAT SERPL-MCNC: 0.92 MG/DL (ref 0.55–1.02)
DIFFERENTIAL METHOD BLD: NORMAL
EOSINOPHIL # BLD: 0.1 K/UL (ref 0–0.4)
EOSINOPHIL NFR BLD: 2 % (ref 0–7)
ERYTHROCYTE [DISTWIDTH] IN BLOOD BY AUTOMATED COUNT: 13.4 % (ref 11.5–14.5)
EST. AVERAGE GLUCOSE BLD GHB EST-MCNC: 105 MG/DL
GLOBULIN SER CALC-MCNC: 3.1 G/DL (ref 2–4)
GLUCOSE SERPL-MCNC: 94 MG/DL (ref 65–100)
HBA1C MFR BLD: 5.3 % (ref 4–5.6)
HCT VFR BLD AUTO: 39.7 % (ref 35–47)
HCV AB SER IA-ACNC: 0.05 INDEX
HCV AB SERPL QL IA: NONREACTIVE
HDLC SERPL-MCNC: 53 MG/DL
HDLC SERPL: 5.4 (ref 0–5)
HGB BLD-MCNC: 12.6 G/DL (ref 11.5–16)
HIV 1+2 AB+HIV1 P24 AG SERPL QL IA: NONREACTIVE
HIV 1/2 RESULT COMMENT: NORMAL
IMM GRANULOCYTES # BLD AUTO: 0 K/UL (ref 0–0.04)
IMM GRANULOCYTES NFR BLD AUTO: 0 % (ref 0–0.5)
LDLC SERPL CALC-MCNC: 172.2 MG/DL (ref 0–100)
LYMPHOCYTES # BLD: 2.5 K/UL (ref 0.8–3.5)
LYMPHOCYTES NFR BLD: 34 % (ref 12–49)
MCH RBC QN AUTO: 28.6 PG (ref 26–34)
MCHC RBC AUTO-ENTMCNC: 31.7 G/DL (ref 30–36.5)
MCV RBC AUTO: 90.2 FL (ref 80–99)
MONOCYTES # BLD: 0.7 K/UL (ref 0–1)
MONOCYTES NFR BLD: 10 % (ref 5–13)
NEUTS SEG # BLD: 4 K/UL (ref 1.8–8)
NEUTS SEG NFR BLD: 53 % (ref 32–75)
NRBC # BLD: 0 K/UL (ref 0–0.01)
NRBC BLD-RTO: 0 PER 100 WBC
PLATELET # BLD AUTO: 305 K/UL (ref 150–400)
PMV BLD AUTO: 10.6 FL (ref 8.9–12.9)
POTASSIUM SERPL-SCNC: 4.2 MMOL/L (ref 3.5–5.1)
PROT SERPL-MCNC: 6.8 G/DL (ref 6.4–8.2)
RBC # BLD AUTO: 4.4 M/UL (ref 3.8–5.2)
SODIUM SERPL-SCNC: 140 MMOL/L (ref 136–145)
TRIGL SERPL-MCNC: 314 MG/DL
VLDLC SERPL CALC-MCNC: 62.8 MG/DL
WBC # BLD AUTO: 7.3 K/UL (ref 3.6–11)

## 2024-12-05 ENCOUNTER — TRANSCRIBE ORDERS (OUTPATIENT)
Dept: SCHEDULING | Age: 64
End: 2024-12-05

## 2024-12-05 DIAGNOSIS — Z12.31 SCREENING MAMMOGRAM FOR BREAST CANCER: Primary | ICD-10-CM

## 2024-12-24 ENCOUNTER — HOSPITAL ENCOUNTER (OUTPATIENT)
Facility: HOSPITAL | Age: 64
Discharge: HOME | End: 2024-12-27
Payer: COMMERCIAL

## 2024-12-24 VITALS — WEIGHT: 190 LBS | BODY MASS INDEX: 28.14 KG/M2 | HEIGHT: 69 IN

## 2024-12-24 DIAGNOSIS — Z12.31 SCREENING MAMMOGRAM FOR BREAST CANCER: ICD-10-CM

## 2024-12-24 PROCEDURE — 77063 BREAST TOMOSYNTHESIS BI: CPT

## (undated) DEVICE — HYPODERMIC SAFETY NEEDLE: Brand: MONOJECT

## (undated) DEVICE — GLOVE SURG SZ 65 THK91MIL LTX FREE SYN POLYISOPRENE

## (undated) DEVICE — SUT SLK 2-0SH 30IN BLK --

## (undated) DEVICE — SOLUTION IRRIGATION H2O 0797305] ICU MEDICAL INC]

## (undated) DEVICE — OPEN-END URETERAL CATHETER: Brand: COOK

## (undated) DEVICE — CHEST PACK-SFMCASU: Brand: MEDLINE INDUSTRIES, INC.

## (undated) DEVICE — CYSTO-SMH: Brand: MEDLINE INDUSTRIES, INC.

## (undated) DEVICE — SYR 10ML LUER LOK 1/5ML GRAD --

## (undated) DEVICE — GLOVE SURG SZ 8 L12IN FNGR THK94MIL STD WHT LTX FREE

## (undated) DEVICE — ROCKER SWITCH PENCIL BLADE ELECTRODE, HOLSTER: Brand: EDGE

## (undated) DEVICE — SHEET,DRAPE,UNDERBUTTOCK,GRAD POUCH,PORT: Brand: MEDLINE

## (undated) DEVICE — SUTURE PERMAHAND SZ 2-0 L18IN NONABSORBABLE BLK L26MM PS 1588H

## (undated) DEVICE — CYSTO/BLADDER IRRIGATION SET, REGULATING CLAMP

## (undated) DEVICE — SOL IRRIGATION INJ NACL 0.9% 500ML BTL